# Patient Record
Sex: MALE | Race: WHITE | NOT HISPANIC OR LATINO
[De-identification: names, ages, dates, MRNs, and addresses within clinical notes are randomized per-mention and may not be internally consistent; named-entity substitution may affect disease eponyms.]

---

## 2017-03-08 ENCOUNTER — OTHER (OUTPATIENT)
Age: 67
End: 2017-03-08

## 2017-04-07 ENCOUNTER — APPOINTMENT (OUTPATIENT)
Dept: INTERNAL MEDICINE | Facility: CLINIC | Age: 67
End: 2017-04-07

## 2017-04-07 VITALS
OXYGEN SATURATION: 94 % | DIASTOLIC BLOOD PRESSURE: 80 MMHG | SYSTOLIC BLOOD PRESSURE: 130 MMHG | HEIGHT: 67 IN | BODY MASS INDEX: 34.21 KG/M2 | WEIGHT: 218 LBS | HEART RATE: 78 BPM

## 2017-04-10 ENCOUNTER — RESULT REVIEW (OUTPATIENT)
Age: 67
End: 2017-04-10

## 2017-04-10 LAB
ALBUMIN SERPL ELPH-MCNC: 4.6 G/DL
ALP BLD-CCNC: 82 U/L
ALT SERPL-CCNC: 24 U/L
ANION GAP SERPL CALC-SCNC: 14 MMOL/L
AST SERPL-CCNC: 23 U/L
BASOPHILS # BLD AUTO: 0.05 K/UL
BASOPHILS NFR BLD AUTO: 0.7 %
BILIRUB SERPL-MCNC: 0.3 MG/DL
BUN SERPL-MCNC: 16 MG/DL
CALCIUM SERPL-MCNC: 9.7 MG/DL
CHLORIDE SERPL-SCNC: 103 MMOL/L
CHOLEST SERPL-MCNC: 175 MG/DL
CHOLEST/HDLC SERPL: 3 RATIO
CO2 SERPL-SCNC: 26 MMOL/L
CREAT SERPL-MCNC: 0.83 MG/DL
EOSINOPHIL # BLD AUTO: 0.11 K/UL
EOSINOPHIL NFR BLD AUTO: 1.6 %
GLUCOSE SERPL-MCNC: 144 MG/DL
HBA1C MFR BLD HPLC: 7.2 %
HCT VFR BLD CALC: 44.6 %
HDLC SERPL-MCNC: 59 MG/DL
HGB BLD-MCNC: 14.9 G/DL
IMM GRANULOCYTES NFR BLD AUTO: 0.3 %
LDLC SERPL CALC-MCNC: 93 MG/DL
LYMPHOCYTES # BLD AUTO: 1.3 K/UL
LYMPHOCYTES NFR BLD AUTO: 19.4 %
MAGNESIUM SERPL-MCNC: 2 MG/DL
MAN DIFF?: NORMAL
MCHC RBC-ENTMCNC: 30.3 PG
MCHC RBC-ENTMCNC: 33.4 GM/DL
MCV RBC AUTO: 90.7 FL
MONOCYTES # BLD AUTO: 0.73 K/UL
MONOCYTES NFR BLD AUTO: 10.9 %
NEUTROPHILS # BLD AUTO: 4.49 K/UL
NEUTROPHILS NFR BLD AUTO: 67.1 %
PLATELET # BLD AUTO: 296 K/UL
POTASSIUM SERPL-SCNC: 4.1 MMOL/L
PROT SERPL-MCNC: 7.3 G/DL
PSA SERPL-MCNC: 7.56 NG/ML
RBC # BLD: 4.92 M/UL
RBC # FLD: 13.6 %
SODIUM SERPL-SCNC: 143 MMOL/L
TRIGL SERPL-MCNC: 116 MG/DL
WBC # FLD AUTO: 6.7 K/UL

## 2017-05-01 ENCOUNTER — RX RENEWAL (OUTPATIENT)
Age: 67
End: 2017-05-01

## 2017-06-23 ENCOUNTER — RX RENEWAL (OUTPATIENT)
Age: 67
End: 2017-06-23

## 2017-07-05 ENCOUNTER — APPOINTMENT (OUTPATIENT)
Dept: INTERNAL MEDICINE | Facility: CLINIC | Age: 67
End: 2017-07-05

## 2017-07-05 ENCOUNTER — NON-APPOINTMENT (OUTPATIENT)
Age: 67
End: 2017-07-05

## 2017-07-05 VITALS
BODY MASS INDEX: 34.21 KG/M2 | RESPIRATION RATE: 14 BRPM | DIASTOLIC BLOOD PRESSURE: 86 MMHG | HEART RATE: 64 BPM | WEIGHT: 218 LBS | SYSTOLIC BLOOD PRESSURE: 128 MMHG | HEIGHT: 67 IN

## 2017-07-06 ENCOUNTER — RESULT REVIEW (OUTPATIENT)
Age: 67
End: 2017-07-06

## 2017-07-06 ENCOUNTER — TRANSCRIPTION ENCOUNTER (OUTPATIENT)
Age: 67
End: 2017-07-06

## 2017-07-06 LAB
ALBUMIN SERPL ELPH-MCNC: 4.8 G/DL
ALP BLD-CCNC: 84 U/L
ALT SERPL-CCNC: 26 U/L
ANION GAP SERPL CALC-SCNC: 16 MMOL/L
APPEARANCE: CLEAR
AST SERPL-CCNC: 22 U/L
BACTERIA: NEGATIVE
BASOPHILS # BLD AUTO: 0.07 K/UL
BASOPHILS NFR BLD AUTO: 1.1 %
BILIRUB SERPL-MCNC: 0.4 MG/DL
BILIRUBIN URINE: NEGATIVE
BLOOD URINE: NEGATIVE
BUN SERPL-MCNC: 13 MG/DL
CALCIUM SERPL-MCNC: 9.5 MG/DL
CHLORIDE SERPL-SCNC: 100 MMOL/L
CHOLEST SERPL-MCNC: 173 MG/DL
CHOLEST/HDLC SERPL: 2.9 RATIO
CO2 SERPL-SCNC: 24 MMOL/L
COLOR: YELLOW
CREAT SERPL-MCNC: 0.74 MG/DL
CREAT SPEC-SCNC: 93 MG/DL
EOSINOPHIL # BLD AUTO: 0.14 K/UL
EOSINOPHIL NFR BLD AUTO: 2.1 %
GLUCOSE QUALITATIVE U: NORMAL MG/DL
GLUCOSE SERPL-MCNC: 154 MG/DL
HBA1C MFR BLD HPLC: 7.1 %
HCT VFR BLD CALC: 42.7 %
HDLC SERPL-MCNC: 60 MG/DL
HGB BLD-MCNC: 14.4 G/DL
HYALINE CASTS: 0 /LPF
IMM GRANULOCYTES NFR BLD AUTO: 0.2 %
KETONES URINE: NEGATIVE
LDLC SERPL CALC-MCNC: 81 MG/DL
LEUKOCYTE ESTERASE URINE: NEGATIVE
LYMPHOCYTES # BLD AUTO: 1.04 K/UL
LYMPHOCYTES NFR BLD AUTO: 15.9 %
MAGNESIUM SERPL-MCNC: 2 MG/DL
MAN DIFF?: NORMAL
MCHC RBC-ENTMCNC: 29.8 PG
MCHC RBC-ENTMCNC: 33.7 GM/DL
MCV RBC AUTO: 88.4 FL
MICROALBUMIN 24H UR DL<=1MG/L-MCNC: 2.3 MG/DL
MICROALBUMIN/CREAT 24H UR-RTO: 25 MG/G
MICROSCOPIC-UA: NORMAL
MONOCYTES # BLD AUTO: 0.65 K/UL
MONOCYTES NFR BLD AUTO: 9.9 %
NEUTROPHILS # BLD AUTO: 4.63 K/UL
NEUTROPHILS NFR BLD AUTO: 70.8 %
NITRITE URINE: NEGATIVE
PH URINE: 7.5
PLATELET # BLD AUTO: 280 K/UL
POTASSIUM SERPL-SCNC: 4 MMOL/L
PROT SERPL-MCNC: 7.3 G/DL
PROTEIN URINE: NEGATIVE MG/DL
RBC # BLD: 4.83 M/UL
RBC # FLD: 13.2 %
RED BLOOD CELLS URINE: 1 /HPF
SODIUM SERPL-SCNC: 140 MMOL/L
SPECIFIC GRAVITY URINE: 1.02
SQUAMOUS EPITHELIAL CELLS: 1 /HPF
TRIGL SERPL-MCNC: 161 MG/DL
UROBILINOGEN URINE: NORMAL MG/DL
WBC # FLD AUTO: 6.54 K/UL
WHITE BLOOD CELLS URINE: 1 /HPF

## 2017-07-14 ENCOUNTER — OTHER (OUTPATIENT)
Age: 67
End: 2017-07-14

## 2017-07-15 LAB
CREAT 24H UR-MCNC: 1.9 G/24 H
CREAT ?TM UR-MCNC: 75 MG/DL
PROT 24H UR-MRATE: 8 MG/DL
PROT ?TM UR-MCNC: 24 HR
PROT UR-MCNC: 198 MG/24 H
SPECIMEN VOL 24H UR: 2475 ML

## 2017-07-17 ENCOUNTER — RESULT REVIEW (OUTPATIENT)
Age: 67
End: 2017-07-17

## 2017-07-26 ENCOUNTER — APPOINTMENT (OUTPATIENT)
Dept: ORTHOPEDIC SURGERY | Facility: CLINIC | Age: 67
End: 2017-07-26

## 2017-07-26 VITALS
BODY MASS INDEX: 32.96 KG/M2 | DIASTOLIC BLOOD PRESSURE: 85 MMHG | HEIGHT: 67 IN | SYSTOLIC BLOOD PRESSURE: 151 MMHG | WEIGHT: 210 LBS | HEART RATE: 71 BPM

## 2017-08-01 ENCOUNTER — TRANSCRIPTION ENCOUNTER (OUTPATIENT)
Age: 67
End: 2017-08-01

## 2017-10-27 ENCOUNTER — RX RENEWAL (OUTPATIENT)
Age: 67
End: 2017-10-27

## 2017-12-13 ENCOUNTER — RX RENEWAL (OUTPATIENT)
Age: 67
End: 2017-12-13

## 2017-12-13 ENCOUNTER — APPOINTMENT (OUTPATIENT)
Dept: INTERNAL MEDICINE | Facility: CLINIC | Age: 67
End: 2017-12-13
Payer: COMMERCIAL

## 2017-12-13 VITALS
HEART RATE: 71 BPM | HEIGHT: 67 IN | DIASTOLIC BLOOD PRESSURE: 80 MMHG | SYSTOLIC BLOOD PRESSURE: 120 MMHG | BODY MASS INDEX: 33.12 KG/M2 | WEIGHT: 211 LBS | RESPIRATION RATE: 98 BRPM

## 2017-12-13 DIAGNOSIS — Z23 ENCOUNTER FOR IMMUNIZATION: ICD-10-CM

## 2017-12-13 PROCEDURE — 99214 OFFICE O/P EST MOD 30 MIN: CPT | Mod: 25

## 2017-12-13 PROCEDURE — 36415 COLL VENOUS BLD VENIPUNCTURE: CPT

## 2017-12-14 LAB
ALBUMIN SERPL ELPH-MCNC: 4.8 G/DL
ALP BLD-CCNC: 90 U/L
ALT SERPL-CCNC: 16 U/L
ANION GAP SERPL CALC-SCNC: 16 MMOL/L
AST SERPL-CCNC: 18 U/L
BASOPHILS # BLD AUTO: 0.07 K/UL
BASOPHILS NFR BLD AUTO: 1.2 %
BILIRUB SERPL-MCNC: 0.6 MG/DL
BUN SERPL-MCNC: 19 MG/DL
CALCIUM SERPL-MCNC: 9.8 MG/DL
CHLORIDE SERPL-SCNC: 99 MMOL/L
CHOLEST SERPL-MCNC: 162 MG/DL
CHOLEST/HDLC SERPL: 2.9 RATIO
CO2 SERPL-SCNC: 25 MMOL/L
CREAT SERPL-MCNC: 0.76 MG/DL
EOSINOPHIL # BLD AUTO: 0.13 K/UL
EOSINOPHIL NFR BLD AUTO: 2.2 %
GLUCOSE SERPL-MCNC: 136 MG/DL
HBA1C MFR BLD HPLC: 6.8 %
HCT VFR BLD CALC: 44 %
HDLC SERPL-MCNC: 56 MG/DL
HGB BLD-MCNC: 14.9 G/DL
IMM GRANULOCYTES NFR BLD AUTO: 0.2 %
LDLC SERPL CALC-MCNC: 81 MG/DL
LYMPHOCYTES # BLD AUTO: 1 K/UL
LYMPHOCYTES NFR BLD AUTO: 16.6 %
MAGNESIUM SERPL-MCNC: 2.1 MG/DL
MAN DIFF?: NORMAL
MCHC RBC-ENTMCNC: 30.5 PG
MCHC RBC-ENTMCNC: 33.9 GM/DL
MCV RBC AUTO: 90.2 FL
MONOCYTES # BLD AUTO: 0.73 K/UL
MONOCYTES NFR BLD AUTO: 12.1 %
NEUTROPHILS # BLD AUTO: 4.07 K/UL
NEUTROPHILS NFR BLD AUTO: 67.7 %
PLATELET # BLD AUTO: 256 K/UL
POTASSIUM SERPL-SCNC: 4.1 MMOL/L
PROT SERPL-MCNC: 7.3 G/DL
PSA SERPL-MCNC: 6.29 NG/ML
RBC # BLD: 4.88 M/UL
RBC # FLD: 13 %
SODIUM SERPL-SCNC: 140 MMOL/L
TRIGL SERPL-MCNC: 126 MG/DL
WBC # FLD AUTO: 6.01 K/UL

## 2017-12-18 ENCOUNTER — RX RENEWAL (OUTPATIENT)
Age: 67
End: 2017-12-18

## 2017-12-19 ENCOUNTER — RESULT REVIEW (OUTPATIENT)
Age: 67
End: 2017-12-19

## 2018-04-24 ENCOUNTER — RX RENEWAL (OUTPATIENT)
Age: 68
End: 2018-04-24

## 2018-05-16 ENCOUNTER — OTHER (OUTPATIENT)
Age: 68
End: 2018-05-16

## 2018-05-29 ENCOUNTER — APPOINTMENT (OUTPATIENT)
Dept: ORTHOPEDIC SURGERY | Facility: CLINIC | Age: 68
End: 2018-05-29
Payer: COMMERCIAL

## 2018-05-29 VITALS
SYSTOLIC BLOOD PRESSURE: 163 MMHG | HEART RATE: 75 BPM | HEIGHT: 67 IN | BODY MASS INDEX: 33.12 KG/M2 | WEIGHT: 211 LBS | DIASTOLIC BLOOD PRESSURE: 89 MMHG

## 2018-05-29 PROCEDURE — 20610 DRAIN/INJ JOINT/BURSA W/O US: CPT | Mod: 50

## 2018-05-29 PROCEDURE — 73564 X-RAY EXAM KNEE 4 OR MORE: CPT | Mod: 50

## 2018-05-29 PROCEDURE — 99215 OFFICE O/P EST HI 40 MIN: CPT | Mod: 25

## 2018-06-18 ENCOUNTER — APPOINTMENT (OUTPATIENT)
Dept: INTERNAL MEDICINE | Facility: CLINIC | Age: 68
End: 2018-06-18
Payer: COMMERCIAL

## 2018-06-18 VITALS
HEART RATE: 68 BPM | BODY MASS INDEX: 33.9 KG/M2 | DIASTOLIC BLOOD PRESSURE: 80 MMHG | HEIGHT: 67 IN | OXYGEN SATURATION: 98 % | SYSTOLIC BLOOD PRESSURE: 130 MMHG | WEIGHT: 216 LBS

## 2018-06-18 PROCEDURE — 99214 OFFICE O/P EST MOD 30 MIN: CPT | Mod: 25

## 2018-06-18 PROCEDURE — 36415 COLL VENOUS BLD VENIPUNCTURE: CPT

## 2018-06-20 ENCOUNTER — RESULT REVIEW (OUTPATIENT)
Age: 68
End: 2018-06-20

## 2018-06-20 LAB
ALBUMIN SERPL ELPH-MCNC: 4.7 G/DL
ALP BLD-CCNC: 83 U/L
ALT SERPL-CCNC: 25 U/L
ANION GAP SERPL CALC-SCNC: 16 MMOL/L
APPEARANCE: CLEAR
AST SERPL-CCNC: 17 U/L
BACTERIA: NEGATIVE
BASOPHILS # BLD AUTO: 0.06 K/UL
BASOPHILS NFR BLD AUTO: 0.8 %
BILIRUB SERPL-MCNC: 0.4 MG/DL
BILIRUBIN URINE: NEGATIVE
BLOOD URINE: NEGATIVE
BUN SERPL-MCNC: 14 MG/DL
CALCIUM SERPL-MCNC: 9.4 MG/DL
CHLORIDE SERPL-SCNC: 101 MMOL/L
CHOLEST SERPL-MCNC: 154 MG/DL
CHOLEST/HDLC SERPL: 2.1 RATIO
CO2 SERPL-SCNC: 23 MMOL/L
COLOR: YELLOW
CREAT SERPL-MCNC: 0.74 MG/DL
EOSINOPHIL # BLD AUTO: 0.13 K/UL
EOSINOPHIL NFR BLD AUTO: 1.8 %
GLUCOSE QUALITATIVE U: NEGATIVE MG/DL
GLUCOSE SERPL-MCNC: 151 MG/DL
HBA1C MFR BLD HPLC: 7.7 %
HCT VFR BLD CALC: 43.6 %
HDLC SERPL-MCNC: 74 MG/DL
HGB BLD-MCNC: 14.4 G/DL
IMM GRANULOCYTES NFR BLD AUTO: 0.3 %
KETONES URINE: NEGATIVE
LDLC SERPL CALC-MCNC: 57 MG/DL
LEUKOCYTE ESTERASE URINE: NEGATIVE
LYMPHOCYTES # BLD AUTO: 1.12 K/UL
LYMPHOCYTES NFR BLD AUTO: 15.5 %
MAGNESIUM SERPL-MCNC: 1.9 MG/DL
MAN DIFF?: NORMAL
MCHC RBC-ENTMCNC: 30 PG
MCHC RBC-ENTMCNC: 33 GM/DL
MCV RBC AUTO: 90.8 FL
MICROSCOPIC-UA: NORMAL
MONOCYTES # BLD AUTO: 0.68 K/UL
MONOCYTES NFR BLD AUTO: 9.4 %
NEUTROPHILS # BLD AUTO: 5.23 K/UL
NEUTROPHILS NFR BLD AUTO: 72.2 %
NITRITE URINE: NEGATIVE
PH URINE: 6
PLATELET # BLD AUTO: 280 K/UL
POTASSIUM SERPL-SCNC: 3.8 MMOL/L
PROT SERPL-MCNC: 7.1 G/DL
PROTEIN URINE: NEGATIVE MG/DL
PSA FREE FLD-MCNC: 17.2
PSA FREE SERPL-MCNC: 0.95 NG/ML
PSA SERPL-MCNC: 5.51 NG/ML
PSA SERPL-MCNC: 5.76 NG/ML
RBC # BLD: 4.8 M/UL
RBC # FLD: 13.8 %
RED BLOOD CELLS URINE: 0 /HPF
SODIUM SERPL-SCNC: 140 MMOL/L
SPECIFIC GRAVITY URINE: 1.02
SQUAMOUS EPITHELIAL CELLS: 0 /HPF
TRIGL SERPL-MCNC: 113 MG/DL
UROBILINOGEN URINE: NEGATIVE MG/DL
WBC # FLD AUTO: 7.24 K/UL
WHITE BLOOD CELLS URINE: 1 /HPF

## 2018-09-26 ENCOUNTER — APPOINTMENT (OUTPATIENT)
Dept: ORTHOPEDIC SURGERY | Facility: CLINIC | Age: 68
End: 2018-09-26
Payer: COMMERCIAL

## 2018-09-26 VITALS
WEIGHT: 216 LBS | HEART RATE: 69 BPM | HEIGHT: 67 IN | BODY MASS INDEX: 33.9 KG/M2 | DIASTOLIC BLOOD PRESSURE: 89 MMHG | SYSTOLIC BLOOD PRESSURE: 160 MMHG

## 2018-09-26 DIAGNOSIS — M17.12 UNILATERAL PRIMARY OSTEOARTHRITIS, LEFT KNEE: ICD-10-CM

## 2018-09-26 DIAGNOSIS — M17.11 UNILATERAL PRIMARY OSTEOARTHRITIS, RIGHT KNEE: ICD-10-CM

## 2018-09-26 PROCEDURE — 99214 OFFICE O/P EST MOD 30 MIN: CPT

## 2018-10-18 ENCOUNTER — RX RENEWAL (OUTPATIENT)
Age: 68
End: 2018-10-18

## 2018-10-18 ENCOUNTER — TRANSCRIPTION ENCOUNTER (OUTPATIENT)
Age: 68
End: 2018-10-18

## 2018-10-22 ENCOUNTER — NON-APPOINTMENT (OUTPATIENT)
Age: 68
End: 2018-10-22

## 2018-10-22 ENCOUNTER — APPOINTMENT (OUTPATIENT)
Dept: INTERNAL MEDICINE | Facility: CLINIC | Age: 68
End: 2018-10-22
Payer: COMMERCIAL

## 2018-10-22 VITALS
DIASTOLIC BLOOD PRESSURE: 78 MMHG | BODY MASS INDEX: 33.27 KG/M2 | HEIGHT: 67 IN | WEIGHT: 212 LBS | RESPIRATION RATE: 16 BRPM | SYSTOLIC BLOOD PRESSURE: 122 MMHG | HEART RATE: 63 BPM

## 2018-10-22 PROCEDURE — 36415 COLL VENOUS BLD VENIPUNCTURE: CPT

## 2018-10-22 PROCEDURE — 99397 PER PM REEVAL EST PAT 65+ YR: CPT | Mod: 25

## 2018-10-22 PROCEDURE — 93000 ELECTROCARDIOGRAM COMPLETE: CPT

## 2018-10-22 NOTE — HEALTH RISK ASSESSMENT
[Good] : ~his/her~  mood as  good [No falls in past year] : Patient reported no falls in the past year [0] : 2) Feeling down, depressed, or hopeless: Not at all (0) [None] : None [Alone] : lives alone [Employed] : employed [Single] : single [# Of Children ___] : has [unfilled] children [Feels Safe at Home] : Feels safe at home [Fully functional (bathing, dressing, toileting, transferring, walking, feeding)] : Fully functional (bathing, dressing, toileting, transferring, walking, feeding) [Fully functional (using the telephone, shopping, preparing meals, housekeeping, doing laundry, using] : Fully functional and needs no help or supervision to perform IADLs (using the telephone, shopping, preparing meals, housekeeping, doing laundry, using transportation, managing medications and managing finances) [Smoke Detector] : smoke detector [Carbon Monoxide Detector] : carbon monoxide detector [Seat Belt] :  uses seat belt [Sunscreen] : uses sunscreen [Discussed at today's visit] : Advance Directives Discussed at today's visit [Designated Healthcare Proxy] : Designated healthcare proxy [Name: ___] : Health Care Proxy's Name: [unfilled]  [] : No [de-identified] : rare [[id number]] : 0 [Change in mental status noted] : No change in mental status noted [Reports changes in hearing] : Reports no changes in hearing [Reports changes in vision] : Reports no changes in vision [Reports changes in dental health] : Reports no changes in dental health [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

## 2018-10-22 NOTE — PHYSICAL EXAM
[General Appearance - Alert] : alert [General Appearance - In No Acute Distress] : in no acute distress [Sclera] : the sclera and conjunctiva were normal [PERRL With Normal Accommodation] : pupils were equal in size, round, and reactive to light [Extraocular Movements] : extraocular movements were intact [Outer Ear] : the ears and nose were normal in appearance [Oropharynx] : the oropharynx was normal [Neck Appearance] : the appearance of the neck was normal [Neck Cervical Mass (___cm)] : no neck mass was observed [Jugular Venous Distention Increased] : there was no jugular-venous distention [Thyroid Diffuse Enlargement] : the thyroid was not enlarged [Thyroid Nodule] : there were no palpable thyroid nodules [Auscultation Breath Sounds / Voice Sounds] : lungs were clear to auscultation bilaterally [Heart Rate And Rhythm] : heart rate was normal and rhythm regular [Heart Sounds] : normal S1 and S2 [Heart Sounds Gallop] : no gallops [Murmurs] : no murmurs [Heart Sounds Pericardial Friction Rub] : no pericardial rub [Arterial Pulses Carotid] : carotid pulses were normal with no bruits [Abdominal Aorta] : the abdominal aorta was normal [Arterial Pulses Femoral] : femoral pulses were normal without bruits [Full Pulse] : the pedal pulses are present [Edema] : there was no peripheral edema [Veins - Varicosity Changes] : there were no varicosital changes [Bowel Sounds] : normal bowel sounds [Abdomen Soft] : soft [Abdomen Tenderness] : non-tender [Abdomen Mass (___ Cm)] : no abdominal mass palpated [Cervical Lymph Nodes Enlarged Posterior Bilaterally] : posterior cervical [Cervical Lymph Nodes Enlarged Anterior Bilaterally] : anterior cervical [Supraclavicular Lymph Nodes Enlarged Bilaterally] : supraclavicular [Axillary Lymph Nodes Enlarged Bilaterally] : axillary [Femoral Lymph Nodes Enlarged Bilaterally] : femoral [Inguinal Lymph Nodes Enlarged Bilaterally] : inguinal [No Spinal Tenderness] : no spinal tenderness [Abnormal Walk] : normal gait [Nail Clubbing] : no clubbing  or cyanosis of the fingernails [Musculoskeletal - Swelling] : no joint swelling seen [Motor Tone] : muscle strength and tone were normal [Skin Color & Pigmentation] : normal skin color and pigmentation [Skin Turgor] : normal skin turgor [] : no rash [Right Foot Was Examined] : right foot was examined [Left Foot Was Examined] : left foot was examined [Normal Appearance] : normal in appearance [Normal in Appearance] : normal in appearance [Normal] : normal [2+] : 2+ in the dorsalis pedis [Cranial Nerves] : cranial nerves 2-12 were intact [No Focal Deficits] : no focal deficits [Oriented To Time, Place, And Person] : oriented to person, place, and time [Impaired Insight] : insight and judgment were intact [Affect] : the affect was normal [FreeTextEntry1] : By urology [#1 Diminished] : number 1 was normal [#2 Diminished] : number 2 was normal [#3 Diminished] : number 3 was normal [#4 Diminished] : number 4 was normal [#5 Diminished] : number 5 was normal [#6 Diminished] : number 6 was normal [#7 Diminished] : number 7 was normal [#8 Diminished] : number 8 was normal [#9 Diminished] : number 9 was normal [#10 Diminished] : number 10 was normal [Over the Past 2 Weeks, Have You Felt Down, Depressed, or Hopeless?] : 1.) Over the past 2 weeks, have you felt down, depressed, or hopeless? No [Over the Past 2 Weeks, Have You Felt Little Interest or Pleasure Doing Things?] : 2.) Over the past 2 weeks, have you felt little interest or pleasure doing things? No

## 2018-10-22 NOTE — ASSESSMENT
[FreeTextEntry1] : 68-year-old male with history of hypertension which is controlled with present medications, hyperlipidemia which is also controlled and type 2 diabetes with most recent hemoglobin A1c increased at 7.7. currently medically stable without any active medical issues.\par Patient told if he diabetes control is no  better he will need medication.\par \par Patient continues to need lifestyle changes with diet exercise and weight loss.\par He is to followup with urology as scheduled\par He is to continue present medications\par \par Colonoscopy August 2018 with followup in 3 years secondary to history of polyps\par Ophthalmology yearly. september 2018 with no diabetic retinopathy.\par \par All of the vaccines up-to-date\par influenza vaccine. He received at work\par \par Followup in 3 months

## 2018-10-22 NOTE — HISTORY OF PRESENT ILLNESS
[FreeTextEntry1] : 68-year-old male with history of diet-controlled type 2 diabetes, hypertension, hyperlipidemia and obesity presents for his yearly physical.\par \par Patient's history also includes BPH with elevated PSA and has had biopsies in the past and follows with urologist . Recent evaluation with urology showed PSA decreased from 7 to 5.6 with 4K  score showing 98% benign.Most recent PSA was stable\par \par The patient had left carpal tunnel syndrome repair April 2017 with good results.\par The patient started with symptoms began in his left hand, mainly his thumb and index finger with surgical evaluation thinking not related to carpal tunnel syndrome, but his neck.\par He did have a recent MRI showing nerve impingement in his neck and will followup with orthopedic.\par \par Otherwise generally is feeling fine without complaints including no chest pain, palpitations, shortness of breath or edema.\par \par The patient's only complaint is increased symptoms to a known arthritic left knee for which he is seeing orthopedic.He is scheduled for a left total knee replacement later this year\par \par The patient has not made any lifestyle changes with diet nor exercising for no weight loss.

## 2018-10-23 ENCOUNTER — TRANSCRIPTION ENCOUNTER (OUTPATIENT)
Age: 68
End: 2018-10-23

## 2018-10-23 ENCOUNTER — RESULT REVIEW (OUTPATIENT)
Age: 68
End: 2018-10-23

## 2018-10-23 DIAGNOSIS — M50.30 OTHER CERVICAL DISC DEGENERATION, UNSPECIFIED CERVICAL REGION: ICD-10-CM

## 2018-10-23 LAB
ALBUMIN SERPL ELPH-MCNC: 4.6 G/DL
ALP BLD-CCNC: 90 U/L
ALT SERPL-CCNC: 27 U/L
ANION GAP SERPL CALC-SCNC: 18 MMOL/L
APPEARANCE: CLEAR
AST SERPL-CCNC: 22 U/L
BACTERIA: NEGATIVE
BASOPHILS # BLD AUTO: 0.05 K/UL
BASOPHILS NFR BLD AUTO: 0.8 %
BILIRUB SERPL-MCNC: 0.4 MG/DL
BILIRUBIN URINE: NEGATIVE
BLOOD URINE: NEGATIVE
BUN SERPL-MCNC: 15 MG/DL
CALCIUM SERPL-MCNC: 9.8 MG/DL
CHLORIDE SERPL-SCNC: 101 MMOL/L
CHOLEST SERPL-MCNC: 168 MG/DL
CHOLEST/HDLC SERPL: 3 RATIO
CO2 SERPL-SCNC: 22 MMOL/L
COLOR: YELLOW
CREAT SERPL-MCNC: 0.74 MG/DL
CREAT SPEC-SCNC: 86 MG/DL
EOSINOPHIL # BLD AUTO: 0.11 K/UL
EOSINOPHIL NFR BLD AUTO: 1.7 %
GLUCOSE QUALITATIVE U: NEGATIVE MG/DL
GLUCOSE SERPL-MCNC: 158 MG/DL
HBA1C MFR BLD HPLC: 7.3 %
HCT VFR BLD CALC: 43.8 %
HDLC SERPL-MCNC: 56 MG/DL
HGB BLD-MCNC: 14.7 G/DL
IMM GRANULOCYTES NFR BLD AUTO: 0.2 %
KETONES URINE: NEGATIVE
LDLC SERPL CALC-MCNC: 86 MG/DL
LEUKOCYTE ESTERASE URINE: NEGATIVE
LYMPHOCYTES # BLD AUTO: 1.18 K/UL
LYMPHOCYTES NFR BLD AUTO: 18 %
MAGNESIUM SERPL-MCNC: 2 MG/DL
MAN DIFF?: NORMAL
MCHC RBC-ENTMCNC: 30.4 PG
MCHC RBC-ENTMCNC: 33.6 GM/DL
MCV RBC AUTO: 90.5 FL
MICROALBUMIN 24H UR DL<=1MG/L-MCNC: 2.8 MG/DL
MICROALBUMIN/CREAT 24H UR-RTO: 33 MG/G
MICROSCOPIC-UA: NORMAL
MONOCYTES # BLD AUTO: 0.48 K/UL
MONOCYTES NFR BLD AUTO: 7.3 %
NEUTROPHILS # BLD AUTO: 4.74 K/UL
NEUTROPHILS NFR BLD AUTO: 72 %
NITRITE URINE: NEGATIVE
PH URINE: 7
PLATELET # BLD AUTO: 294 K/UL
POTASSIUM SERPL-SCNC: 4 MMOL/L
PROT SERPL-MCNC: 7.3 G/DL
PROTEIN URINE: NEGATIVE MG/DL
RBC # BLD: 4.84 M/UL
RBC # FLD: 13 %
RED BLOOD CELLS URINE: 1 /HPF
SODIUM SERPL-SCNC: 141 MMOL/L
SPECIFIC GRAVITY URINE: 1.02
SQUAMOUS EPITHELIAL CELLS: 0 /HPF
TRIGL SERPL-MCNC: 131 MG/DL
UROBILINOGEN URINE: NEGATIVE MG/DL
WBC # FLD AUTO: 6.57 K/UL
WHITE BLOOD CELLS URINE: 0 /HPF

## 2018-10-24 DIAGNOSIS — R80.9 PROTEINURIA, UNSPECIFIED: ICD-10-CM

## 2018-11-16 ENCOUNTER — RESULT REVIEW (OUTPATIENT)
Age: 68
End: 2018-11-16

## 2018-11-16 ENCOUNTER — TRANSCRIPTION ENCOUNTER (OUTPATIENT)
Age: 68
End: 2018-11-16

## 2018-11-16 LAB
CREAT 24H UR-MCNC: 1.8 G/24 H
CREAT ?TM UR-MCNC: 91 MG/DL
PROT 24H UR-MRATE: 7 MG/DL
PROT ?TM UR-MCNC: 24 HR
PROT UR-MCNC: 140 MG/24 H
SPECIMEN VOL 24H UR: 2000 ML

## 2018-11-23 ENCOUNTER — OUTPATIENT (OUTPATIENT)
Dept: OUTPATIENT SERVICES | Facility: HOSPITAL | Age: 68
LOS: 1 days | End: 2018-11-23
Payer: COMMERCIAL

## 2018-11-23 VITALS
HEIGHT: 67 IN | HEART RATE: 59 BPM | DIASTOLIC BLOOD PRESSURE: 81 MMHG | SYSTOLIC BLOOD PRESSURE: 134 MMHG | TEMPERATURE: 98 F | RESPIRATION RATE: 16 BRPM | WEIGHT: 207.23 LBS

## 2018-11-23 DIAGNOSIS — Z98.890 OTHER SPECIFIED POSTPROCEDURAL STATES: Chronic | ICD-10-CM

## 2018-11-23 DIAGNOSIS — Z09 ENCOUNTER FOR FOLLOW-UP EXAMINATION AFTER COMPLETED TREATMENT FOR CONDITIONS OTHER THAN MALIGNANT NEOPLASM: Chronic | ICD-10-CM

## 2018-11-23 DIAGNOSIS — M17.12 UNILATERAL PRIMARY OSTEOARTHRITIS, LEFT KNEE: ICD-10-CM

## 2018-11-23 DIAGNOSIS — Z90.49 ACQUIRED ABSENCE OF OTHER SPECIFIED PARTS OF DIGESTIVE TRACT: Chronic | ICD-10-CM

## 2018-11-23 DIAGNOSIS — Z29.9 ENCOUNTER FOR PROPHYLACTIC MEASURES, UNSPECIFIED: ICD-10-CM

## 2018-11-23 DIAGNOSIS — Z90.89 ACQUIRED ABSENCE OF OTHER ORGANS: Chronic | ICD-10-CM

## 2018-11-23 DIAGNOSIS — Z01.818 ENCOUNTER FOR OTHER PREPROCEDURAL EXAMINATION: ICD-10-CM

## 2018-11-23 DIAGNOSIS — I10 ESSENTIAL (PRIMARY) HYPERTENSION: ICD-10-CM

## 2018-11-23 DIAGNOSIS — Z98.49 CATARACT EXTRACTION STATUS, UNSPECIFIED EYE: Chronic | ICD-10-CM

## 2018-11-23 LAB
ANION GAP SERPL CALC-SCNC: 14 MMOL/L — SIGNIFICANT CHANGE UP (ref 5–17)
APTT BLD: 35.8 SEC — SIGNIFICANT CHANGE UP (ref 27.5–36.3)
BASOPHILS # BLD AUTO: 0 K/UL — SIGNIFICANT CHANGE UP (ref 0–0.2)
BASOPHILS NFR BLD AUTO: 0.7 % — SIGNIFICANT CHANGE UP (ref 0–2)
BLD GP AB SCN SERPL QL: SIGNIFICANT CHANGE UP
BUN SERPL-MCNC: 13 MG/DL — SIGNIFICANT CHANGE UP (ref 8–20)
CALCIUM SERPL-MCNC: 10.1 MG/DL — SIGNIFICANT CHANGE UP (ref 8.6–10.2)
CHLORIDE SERPL-SCNC: 100 MMOL/L — SIGNIFICANT CHANGE UP (ref 98–107)
CO2 SERPL-SCNC: 26 MMOL/L — SIGNIFICANT CHANGE UP (ref 22–29)
CREAT SERPL-MCNC: 0.65 MG/DL — SIGNIFICANT CHANGE UP (ref 0.5–1.3)
EOSINOPHIL # BLD AUTO: 0.1 K/UL — SIGNIFICANT CHANGE UP (ref 0–0.5)
EOSINOPHIL NFR BLD AUTO: 1.3 % — SIGNIFICANT CHANGE UP (ref 0–5)
GLUCOSE SERPL-MCNC: 136 MG/DL — HIGH (ref 70–115)
HCT VFR BLD CALC: 44.2 % — SIGNIFICANT CHANGE UP (ref 42–52)
HGB BLD-MCNC: 15 G/DL — SIGNIFICANT CHANGE UP (ref 14–18)
INR BLD: 1 RATIO — SIGNIFICANT CHANGE UP (ref 0.88–1.16)
LYMPHOCYTES # BLD AUTO: 0.9 K/UL — LOW (ref 1–4.8)
LYMPHOCYTES # BLD AUTO: 16.1 % — LOW (ref 20–55)
MCHC RBC-ENTMCNC: 30.9 PG — SIGNIFICANT CHANGE UP (ref 27–31)
MCHC RBC-ENTMCNC: 33.9 G/DL — SIGNIFICANT CHANGE UP (ref 32–36)
MCV RBC AUTO: 90.9 FL — SIGNIFICANT CHANGE UP (ref 80–94)
MONOCYTES # BLD AUTO: 0.6 K/UL — SIGNIFICANT CHANGE UP (ref 0–0.8)
MONOCYTES NFR BLD AUTO: 10.1 % — HIGH (ref 3–10)
MRSA PCR RESULT.: SIGNIFICANT CHANGE UP
NEUTROPHILS # BLD AUTO: 4 K/UL — SIGNIFICANT CHANGE UP (ref 1.8–8)
NEUTROPHILS NFR BLD AUTO: 71.6 % — SIGNIFICANT CHANGE UP (ref 37–73)
PLATELET # BLD AUTO: 250 K/UL — SIGNIFICANT CHANGE UP (ref 150–400)
POTASSIUM SERPL-MCNC: 3.6 MMOL/L — SIGNIFICANT CHANGE UP (ref 3.5–5.3)
POTASSIUM SERPL-SCNC: 3.6 MMOL/L — SIGNIFICANT CHANGE UP (ref 3.5–5.3)
PROTHROM AB SERPL-ACNC: 11.5 SEC — SIGNIFICANT CHANGE UP (ref 10–12.9)
RBC # BLD: 4.86 M/UL — SIGNIFICANT CHANGE UP (ref 4.6–6.2)
RBC # FLD: 13.2 % — SIGNIFICANT CHANGE UP (ref 11–15.6)
S AUREUS DNA NOSE QL NAA+PROBE: DETECTED
SODIUM SERPL-SCNC: 140 MMOL/L — SIGNIFICANT CHANGE UP (ref 135–145)
TYPE + AB SCN PNL BLD: SIGNIFICANT CHANGE UP
WBC # BLD: 5.5 K/UL — SIGNIFICANT CHANGE UP (ref 4.8–10.8)
WBC # FLD AUTO: 5.5 K/UL — SIGNIFICANT CHANGE UP (ref 4.8–10.8)

## 2018-11-23 PROCEDURE — 87640 STAPH A DNA AMP PROBE: CPT

## 2018-11-23 PROCEDURE — 86901 BLOOD TYPING SEROLOGIC RH(D): CPT

## 2018-11-23 PROCEDURE — 87641 MR-STAPH DNA AMP PROBE: CPT

## 2018-11-23 PROCEDURE — 85027 COMPLETE CBC AUTOMATED: CPT

## 2018-11-23 PROCEDURE — 93005 ELECTROCARDIOGRAM TRACING: CPT

## 2018-11-23 PROCEDURE — 85730 THROMBOPLASTIN TIME PARTIAL: CPT

## 2018-11-23 PROCEDURE — 85610 PROTHROMBIN TIME: CPT

## 2018-11-23 PROCEDURE — 80048 BASIC METABOLIC PNL TOTAL CA: CPT

## 2018-11-23 PROCEDURE — G0463: CPT

## 2018-11-23 PROCEDURE — 86850 RBC ANTIBODY SCREEN: CPT

## 2018-11-23 PROCEDURE — 86900 BLOOD TYPING SEROLOGIC ABO: CPT

## 2018-11-23 PROCEDURE — 36415 COLL VENOUS BLD VENIPUNCTURE: CPT

## 2018-11-23 PROCEDURE — 93010 ELECTROCARDIOGRAM REPORT: CPT

## 2018-11-23 RX ORDER — MUPIROCIN 20 MG/G
1 OINTMENT TOPICAL
Qty: 1 | Refills: 0 | OUTPATIENT
Start: 2018-11-23 | End: 2018-11-27

## 2018-11-23 NOTE — H&P PST ADULT - ASSESSMENT
68 year old male with h/o HTN, HLD and diet controlled DM present with c/p left knee pain    CAPRINI SCORE [CLOT]    AGE RELATED RISK FACTORS                                                       MOBILITY RELATED FACTORS  [ ] Age 41-60 years                                            (1 Point)                  [ ] Bed rest                                                        (1 Point)  [x ] Age: 61-74 years                                           (2 Points)                 [ ] Plaster cast                                                   (2 Points)  [ ] Age= 75 years                                              (3 Points)                 [ ] Bed bound for more than 72 hours                 (2 Points)    DISEASE RELATED RISK FACTORS                                               GENDER SPECIFIC FACTORS  [x ] Edema in the lower extremities                       (1 Point)                  [ ] Pregnancy                                                     (1 Point)  [ ] Varicose veins                                               (1 Point)                  [ ] Post-partum < 6 weeks                                   (1 Point)             [x ] BMI > 25 Kg/m2                                            (1 Point)                  [ ] Hormonal therapy  or oral contraception          (1 Point)                 [ ] Sepsis (in the previous month)                        (1 Point)                  [ ] History of pregnancy complications                 (1 point)  [ ] Pneumonia or serious lung disease                                               [ ] Unexplained or recurrent                     (1 Point)           (in the previous month)                               (1 Point)  [ ] Abnormal pulmonary function test                     (1 Point)                 SURGERY RELATED RISK FACTORS  [ ] Acute myocardial infarction                              (1 Point)                 [ ]  Section                                             (1 Point)  [ ] Congestive heart failure (in the previous month)  (1 Point)               [ ] Minor surgery                                                  (1 Point)   [ ] Inflammatory bowel disease                             (1 Point)                 [ ] Arthroscopic surgery                                        (2 Points)  [ ] Central venous access                                      (2 Points)                [ ] General surgery lasting more than 45 minutes   (2 Points)       [ ] Stroke (in the previous month)                          (5 Points)               [x ] Elective arthroplasty                                         (5 Points)                                                                                                                                               HEMATOLOGY RELATED FACTORS                                                 TRAUMA RELATED RISK FACTORS  [ ] Prior episodes of VTE                                     (3 Points)                [ ] Fracture of the hip, pelvis, or leg                       (5 Points)  [ ] Positive family history for VTE                         (3 Points)                 [ ] Acute spinal cord injury (in the previous month)  (5 Points)  [ ] Prothrombin 16847 A                                     (3 Points)                 [ ] Paralysis  (less than 1 month)                             (5 Points)  [ ] Factor V Leiden                                             (3 Points)                  [ ] Multiple Trauma within 1 month                        (5 Points)  [ ] Lupus anticoagulants                                     (3 Points)                                                           [ ] Anticardiolipin antibodies                               (3 Points)                                                       [ ] High homocysteine in the blood                      (3 Points)                                             [ ] Other congenital or acquired thrombophilia      (3 Points)                                                [ ] Heparin induced thrombocytopenia                  (3 Points)                                          Total Score [ 9     ]    OPIOID RISK TOOL    ARLYN EACH BOX THAT APPLIES AND ADD TOTALS AT THE END    FAMILY HISTORY OF SUBSTANCE ABUSE                 FEMALE         MALE                                                Alcohol                             [  ]1 pt          [  ]3pts                                               Illegal Durgs                     [  ]2 pts        [  ]3pts                                               Rx Drugs                           [  ]4 pts        [  ]4 pts    PERSONAL HISTORY OF SUBSTANCE ABUSE                                                                                          Alcohol                             [  ]3 pts       [  ]3 pts                                               Illegal Drugs                     [  ]4 pts        [  ]4 pts                                               Rx Drugs                           [  ]5 pts        [  ]5 pts    AGE BETWEEN 16-45 YEARS                                      [  ]1 pt         [  ]1 pt    HISTORY OF PREADOLESCENT   SEXUAL ABUSE                                                             [  ]3 pts        [  ]0pts    PSYCHOLOGICAL DISEASE                     ADD, OCD, Bipolar, Schizophrenia        [  ]2 pts         [  ]2 pts                      Depression                                               [  ]1 pt           [  ]1 pt           SCORING TOTAL   (Zero)              (***)                                     A score of 3 or lower indicated LOW risk for future opioid abuse  A score of 4 to 7 indicated moderate risk for future opioid abuse  A score of 8 or higher indicates a high risk for opioid abuse

## 2018-11-23 NOTE — H&P PST ADULT - PSH
H/O prostate biopsy    History of carpal tunnel release of both wrists  2016  S/P appendectomy    S/P inguinal hernia repair  right and left  S/P LASIK surgery of both eyes    S/P tonsillectomy and adenoidectomy    S/P umbilical hernia repair, follow-up exam

## 2018-11-23 NOTE — PATIENT PROFILE ADULT - FALL HARM RISK
bones(Osteoporosis,prev fx,steroid use,metastatic bone ca) surgery/bones(Osteoporosis,prev fx,steroid use,metastatic bone ca)

## 2018-11-23 NOTE — H&P PST ADULT - FAMILY HISTORY
Mother  Still living? No  Family history of heart disease, Age at diagnosis: Age Unknown  Family history of breast cancer, Age at diagnosis: Age Unknown     Father  Still living? Unknown  Family history of pancreatic cancer, Age at diagnosis: Age Unknown     Sibling  Still living? Unknown  Family history of diabetes mellitus, Age at diagnosis: Age Unknown

## 2018-11-23 NOTE — H&P PST ADULT - NSANTHOSAYNRD_GEN_A_CORE
No. SAMRA screening performed.  STOP BANG Legend: 0-2 = LOW Risk; 3-4 = INTERMEDIATE Risk; 5-8 = HIGH Risk

## 2018-11-23 NOTE — H&P PST ADULT - HISTORY OF PRESENT ILLNESS
68 year old male present with left knee pain, unrelieved with conservative treatments.  He is now schedule for left knee total joint replacement.

## 2018-11-23 NOTE — H&P PST ADULT - PMH
Borderline diabetes mellitus  weight controled  BPH with elevated PSA    HTN (hypertension)    Lyme disease    OA (osteoarthritis) Borderline diabetes mellitus  weight controlled  BPH with elevated PSA    HTN (hypertension)    Lyme disease    OA (osteoarthritis)

## 2018-11-24 PROBLEM — R73.03 PREDIABETES: Chronic | Status: ACTIVE | Noted: 2018-11-23

## 2018-11-26 ENCOUNTER — RESULT CHARGE (OUTPATIENT)
Age: 68
End: 2018-11-26

## 2018-11-26 PROBLEM — I10 ESSENTIAL (PRIMARY) HYPERTENSION: Chronic | Status: ACTIVE | Noted: 2018-11-23

## 2018-11-26 PROBLEM — M19.90 UNSPECIFIED OSTEOARTHRITIS, UNSPECIFIED SITE: Chronic | Status: ACTIVE | Noted: 2018-11-23

## 2018-11-26 PROBLEM — N40.0 BENIGN PROSTATIC HYPERPLASIA WITHOUT LOWER URINARY TRACT SYMPTOMS: Chronic | Status: ACTIVE | Noted: 2018-11-23

## 2018-11-26 PROBLEM — A69.20 LYME DISEASE, UNSPECIFIED: Chronic | Status: ACTIVE | Noted: 2018-11-23

## 2018-11-27 ENCOUNTER — APPOINTMENT (OUTPATIENT)
Dept: INTERNAL MEDICINE | Facility: CLINIC | Age: 68
End: 2018-11-27
Payer: COMMERCIAL

## 2018-11-27 VITALS — DIASTOLIC BLOOD PRESSURE: 85 MMHG | SYSTOLIC BLOOD PRESSURE: 125 MMHG | HEART RATE: 69 BPM

## 2018-11-27 VITALS — WEIGHT: 212 LBS | BODY MASS INDEX: 33.27 KG/M2 | HEIGHT: 67 IN

## 2018-11-27 DIAGNOSIS — H61.21 IMPACTED CERUMEN, RIGHT EAR: ICD-10-CM

## 2018-11-27 DIAGNOSIS — H93.8X1 OTHER SPECIFIED DISORDERS OF RIGHT EAR: ICD-10-CM

## 2018-11-27 PROCEDURE — 99213 OFFICE O/P EST LOW 20 MIN: CPT | Mod: 25

## 2018-11-27 PROCEDURE — 69209 REMOVE IMPACTED EAR WAX UNI: CPT | Mod: RT

## 2018-11-27 NOTE — HISTORY OF PRESENT ILLNESS
[FreeTextEntry8] : Patient presents complaining that his right ear feels fluid-filled with associated decreased hearing and mild ringing for 5 days. Patient states that canal is more narrow and he does get an issue with it from time to time and thought he got water in it from the shower. Patient asked the pharmacist what to take and he took Claritin x2 days without benefit. Patient states his left ear is fine he has no associated symptoms i.e. sore throat/congestion/cough/fever.

## 2018-11-27 NOTE — ASSESSMENT
[FreeTextEntry1] : Status post irrigation-patient tolerated well. Told patient he can take Claritin and also use Flonase in hopes of opening eustachian tube. Patient will call if symptoms persist or worsen and return to the office as scheduled

## 2018-11-28 ENCOUNTER — APPOINTMENT (OUTPATIENT)
Dept: INTERNAL MEDICINE | Facility: CLINIC | Age: 68
End: 2018-11-28
Payer: COMMERCIAL

## 2018-11-28 VITALS
BODY MASS INDEX: 31.71 KG/M2 | WEIGHT: 202 LBS | HEIGHT: 67 IN | DIASTOLIC BLOOD PRESSURE: 82 MMHG | SYSTOLIC BLOOD PRESSURE: 134 MMHG | RESPIRATION RATE: 16 BRPM | HEART RATE: 71 BPM

## 2018-11-28 PROCEDURE — 99214 OFFICE O/P EST MOD 30 MIN: CPT

## 2018-11-28 NOTE — ASSESSMENT
[Patient Optimized for Surgery] : Patient optimized for surgery [Cardiology consultation] : Cardiology consultation [Continue medications as is] : Continue current medications [FreeTextEntry4] : 68-year-old male currently medically stable, with no contraindication to planned procedure.\par \par Patient with increased cardiovascular risks therefore, to get preoperative cardiac evaluation.

## 2018-11-28 NOTE — RESULTS/DATA
[] : results reviewed [de-identified] : WNL [de-identified] : WNL [de-identified] : WNL [de-identified] : SB,IRBBB, Insignificant inferior Qs

## 2018-11-28 NOTE — PHYSICAL EXAM
[No Acute Distress] : no acute distress [Well-Appearing] : well-appearing [Normal Sclera/Conjunctiva] : normal sclera/conjunctiva [No JVD] : no jugular venous distention [No Respiratory Distress] : no respiratory distress  [Clear to Auscultation] : lungs were clear to auscultation bilaterally [No Accessory Muscle Use] : no accessory muscle use [Normal Rate] : normal rate  [Regular Rhythm] : with a regular rhythm [No Carotid Bruits] : no carotid bruits [No Edema] : there was no peripheral edema [Soft] : abdomen soft [Non Tender] : non-tender [Non-distended] : non-distended [No Masses] : no abdominal mass palpated [No HSM] : no HSM [No Focal Deficits] : no focal deficits [Normal Affect] : the affect was normal [de-identified] : armen

## 2018-11-28 NOTE — HISTORY OF PRESENT ILLNESS
[No Pertinent Cardiac History] : no history of aortic stenosis, atrial fibrillation, coronary artery disease, recent myocardial infarction, or implantable device/pacemaker [No Pertinent Pulmonary History] : no history of asthma, COPD, sleep apnea, or smoking [No Adverse Anesthesia Reaction] : no adverse anesthesia reaction in self or family member [Diabetes] : diabetes [(Patient denies any chest pain, claudication, dyspnea on exertion, orthopnea, palpitations or syncope)] : Patient denies any chest pain, claudication, dyspnea on exertion, orthopnea, palpitations or syncope [Chronic Anticoagulation] : no chronic anticoagulation [Chronic Kidney Disease] : no chronic kidney disease [FreeTextEntry1] : Left total knee replacement [FreeTextEntry2] : January 6,2018 [FreeTextEntry3] : Dr Gonzales [FreeTextEntry4] : 68-year-old male presents for medical evaluation prior to left total knee replacement.\par \par Patient's medical history includes hypertension, hyperlipidemia, type 2 diabetes, and obesity.\par The patient had no cardiac history, but does have a history of mild mitral regurgitation.\par \par No other cardiopulmonary, hepatorenal, hematological, diagnoses.\par \par Overall, he is feeling well without chest pain, palpitations, shortness of breath, or edema.

## 2018-11-30 ENCOUNTER — APPOINTMENT (OUTPATIENT)
Dept: CARDIOLOGY | Facility: CLINIC | Age: 68
End: 2018-11-30
Payer: COMMERCIAL

## 2018-11-30 ENCOUNTER — APPOINTMENT (OUTPATIENT)
Dept: INTERNAL MEDICINE | Facility: CLINIC | Age: 68
End: 2018-11-30
Payer: COMMERCIAL

## 2018-11-30 VITALS
OXYGEN SATURATION: 97 % | DIASTOLIC BLOOD PRESSURE: 80 MMHG | HEIGHT: 67 IN | WEIGHT: 206 LBS | SYSTOLIC BLOOD PRESSURE: 151 MMHG | RESPIRATION RATE: 16 BRPM | BODY MASS INDEX: 32.33 KG/M2 | HEART RATE: 80 BPM

## 2018-11-30 VITALS — DIASTOLIC BLOOD PRESSURE: 74 MMHG | SYSTOLIC BLOOD PRESSURE: 126 MMHG

## 2018-11-30 PROBLEM — H61.21 IMPACTED CERUMEN OF RIGHT EAR: Status: RESOLVED | Noted: 2018-11-27 | Resolved: 2018-11-30

## 2018-11-30 PROBLEM — H93.8X1 CONGESTION OF RIGHT EAR: Status: RESOLVED | Noted: 2018-11-27 | Resolved: 2018-11-30

## 2018-11-30 PROCEDURE — 93000 ELECTROCARDIOGRAM COMPLETE: CPT

## 2018-11-30 PROCEDURE — 99204 OFFICE O/P NEW MOD 45 MIN: CPT

## 2018-11-30 PROCEDURE — 99213 OFFICE O/P EST LOW 20 MIN: CPT

## 2018-11-30 RX ORDER — AMOXICILLIN AND CLAVULANATE POTASSIUM 875; 125 MG/1; MG/1
875-125 TABLET, COATED ORAL
Qty: 14 | Refills: 0 | Status: DISCONTINUED | COMMUNITY
Start: 2018-11-30 | End: 2018-11-30

## 2018-11-30 NOTE — PHYSICAL EXAM
[General Appearance - Well Developed] : well developed [Normal Appearance] : normal appearance [Well Groomed] : well groomed [General Appearance - Well Nourished] : well nourished [General Appearance - In No Acute Distress] : no acute distress [Normal Conjunctiva] : the conjunctiva exhibited no abnormalities [No Oral Pallor] : no oral pallor [No Oral Cyanosis] : no oral cyanosis [Normal Jugular Venous A Waves Present] : normal jugular venous A waves present [Normal Jugular Venous V Waves Present] : normal jugular venous V waves present [Respiration, Rhythm And Depth] : normal respiratory rhythm and effort [Exaggerated Use Of Accessory Muscles For Inspiration] : no accessory muscle use [Auscultation Breath Sounds / Voice Sounds] : lungs were clear to auscultation bilaterally [Heart Rate And Rhythm] : heart rate and rhythm were normal [Heart Sounds] : normal S1 and S2 [Murmurs] : no murmurs present [Arterial Pulses Normal] : the arterial pulses were normal [Bowel Sounds] : normal bowel sounds [Abdomen Soft] : soft [Abdomen Tenderness] : non-tender [Abnormal Walk] : normal gait [Nail Clubbing] : no clubbing of the fingernails [Cyanosis, Localized] : no localized cyanosis [Skin Turgor] : normal skin turgor [] : no rash [Oriented To Time, Place, And Person] : oriented to person, place, and time [Impaired Insight] : insight and judgment were intact [Affect] : the affect was normal [Memory Recent] : recent memory was not impaired

## 2018-11-30 NOTE — HISTORY OF PRESENT ILLNESS
[FreeTextEntry8] : Patient was seen on 11/27 complaining of..\par \par Patient presents complaining that his right ear feels fluid-filled with associated decreased hearing and mild ringing for 5 days. Patient states that canal is more narrow and he does get an issue with it from time to time and thought he got water in it from the shower. Patient asked the pharmacist what to take and he took Claritin x2 days without benefit. Patient states his left ear is fine he has no associated symptoms i.e. sore throat/congestion/cough/fever.\par \par ** RIGHT Ear was irrigated without complication, patient states the ear has been okay up until last night when he started to develop pain in the ear, no discharge and reports hearing is okay but has mild ringing. Left ear is normal and no associated sick symptoms including fever. He has surgery scheduled in one week

## 2018-11-30 NOTE — PHYSICAL EXAM
[de-identified] : Left ear is normal, right ear with+Pinnal pain, negative tragal, canal is swollen with mild bulge to TM

## 2018-11-30 NOTE — ASSESSMENT
[FreeTextEntry1] : Patient given Augmentin 875 mg one b.i.d. #14 and Ciprodex otic. Patient will call if symptoms persist or worsen in the office as scheduled for regular followup

## 2018-11-30 NOTE — PHYSICAL EXAM
[Normal Outer Ear/Nose] : the outer ears and nose were normal in appearance [de-identified] : Left ear is normal, right ear with cerumen impaction status post removal-patient tolerated well [No Acute Distress] : no acute distress [Normal Oropharynx] : the oropharynx was normal [Normal Nasal Mucosa] : the nasal mucosa was normal [Supple] : supple [No Respiratory Distress] : no respiratory distress  [Clear to Auscultation] : lungs were clear to auscultation bilaterally [Normal Rate] : normal rate  [Regular Rhythm] : with a regular rhythm [Normal Affect] : the affect was normal [Normal Mood] : the mood was normal

## 2018-12-03 ENCOUNTER — APPOINTMENT (OUTPATIENT)
Dept: CARDIOLOGY | Facility: CLINIC | Age: 68
End: 2018-12-03
Payer: COMMERCIAL

## 2018-12-03 PROCEDURE — 78452 HT MUSCLE IMAGE SPECT MULT: CPT

## 2018-12-03 PROCEDURE — A9500: CPT

## 2018-12-03 PROCEDURE — 93015 CV STRESS TEST SUPVJ I&R: CPT

## 2018-12-04 NOTE — REVIEW OF SYSTEMS
[Recent Weight Loss (___ Lbs)] : recent [unfilled] ~Ulb weight loss [Urinary Frequency] : urinary frequency [see HPI] : see HPI [Negative] : Heme/Lymph [Fever] : no fever [Chills] : no chills

## 2018-12-04 NOTE — HISTORY OF PRESENT ILLNESS
[Preoperative Visit] : for a medical evaluation prior to surgery [Scheduled Procedure ___] : a [unfilled] [Date of Surgery ___] : on [unfilled] [Surgeon Name ___] : surgeon: [unfilled] [Good] : Good [Urinary Frequency] : urinary frequency [Fever] : no fever [Chills] : no chills [Fatigue] : no fatigue [Chest Pain] : no chest pain [Cough] : no cough [Dyspnea] : no dyspnea [Dysuria] : no dysuria [Nausea] : no nausea [Vomiting] : no vomiting [Diarrhea] : no diarrhea [Abdominal Pain] : no abdominal pain [Easy Bruising] : no easy bruising [Lower Extremity Swelling] : no lower extremity swelling [Poor Exercise Tolerance] : no poor exercise tolerance

## 2018-12-05 ENCOUNTER — TRANSCRIPTION ENCOUNTER (OUTPATIENT)
Age: 68
End: 2018-12-05

## 2018-12-06 ENCOUNTER — RX RENEWAL (OUTPATIENT)
Age: 68
End: 2018-12-06

## 2018-12-06 ENCOUNTER — INPATIENT (INPATIENT)
Facility: HOSPITAL | Age: 68
LOS: 0 days | Discharge: HOME | DRG: 470 | End: 2018-12-07
Attending: ORTHOPAEDIC SURGERY | Admitting: ORTHOPAEDIC SURGERY
Payer: COMMERCIAL

## 2018-12-06 ENCOUNTER — APPOINTMENT (OUTPATIENT)
Dept: ORTHOPEDIC SURGERY | Facility: HOSPITAL | Age: 68
End: 2018-12-06

## 2018-12-06 ENCOUNTER — TRANSCRIPTION ENCOUNTER (OUTPATIENT)
Age: 68
End: 2018-12-06

## 2018-12-06 VITALS
SYSTOLIC BLOOD PRESSURE: 140 MMHG | DIASTOLIC BLOOD PRESSURE: 82 MMHG | WEIGHT: 199.96 LBS | RESPIRATION RATE: 16 BRPM | HEART RATE: 69 BPM | TEMPERATURE: 98 F | OXYGEN SATURATION: 96 % | HEIGHT: 67 IN

## 2018-12-06 DIAGNOSIS — Z98.890 OTHER SPECIFIED POSTPROCEDURAL STATES: Chronic | ICD-10-CM

## 2018-12-06 DIAGNOSIS — Z90.49 ACQUIRED ABSENCE OF OTHER SPECIFIED PARTS OF DIGESTIVE TRACT: Chronic | ICD-10-CM

## 2018-12-06 DIAGNOSIS — Z90.89 ACQUIRED ABSENCE OF OTHER ORGANS: Chronic | ICD-10-CM

## 2018-12-06 DIAGNOSIS — M17.12 UNILATERAL PRIMARY OSTEOARTHRITIS, LEFT KNEE: ICD-10-CM

## 2018-12-06 DIAGNOSIS — R73.03 PREDIABETES: ICD-10-CM

## 2018-12-06 DIAGNOSIS — Z09 ENCOUNTER FOR FOLLOW-UP EXAMINATION AFTER COMPLETED TREATMENT FOR CONDITIONS OTHER THAN MALIGNANT NEOPLASM: Chronic | ICD-10-CM

## 2018-12-06 DIAGNOSIS — I10 ESSENTIAL (PRIMARY) HYPERTENSION: ICD-10-CM

## 2018-12-06 LAB
ABO RH CONFIRMATION: SIGNIFICANT CHANGE UP
GLUCOSE BLDC GLUCOMTR-MCNC: 131 MG/DL — HIGH (ref 70–99)
GLUCOSE BLDC GLUCOMTR-MCNC: 132 MG/DL — HIGH (ref 70–99)
GLUCOSE BLDC GLUCOMTR-MCNC: 134 MG/DL — HIGH (ref 70–99)

## 2018-12-06 PROCEDURE — 99223 1ST HOSP IP/OBS HIGH 75: CPT

## 2018-12-06 PROCEDURE — 73560 X-RAY EXAM OF KNEE 1 OR 2: CPT | Mod: 26,LT

## 2018-12-06 PROCEDURE — 27447 TOTAL KNEE ARTHROPLASTY: CPT | Mod: LT

## 2018-12-06 PROCEDURE — 27447 TOTAL KNEE ARTHROPLASTY: CPT | Mod: AS,LT

## 2018-12-06 PROCEDURE — 20985 CPTR-ASST DIR MS PX: CPT

## 2018-12-06 RX ORDER — DOCUSATE SODIUM 100 MG
100 CAPSULE ORAL THREE TIMES A DAY
Qty: 0 | Refills: 0 | Status: DISCONTINUED | OUTPATIENT
Start: 2018-12-06 | End: 2018-12-07

## 2018-12-06 RX ORDER — TRANEXAMIC ACID 100 MG/ML
900 INJECTION, SOLUTION INTRAVENOUS ONCE
Qty: 0 | Refills: 0 | Status: DISCONTINUED | OUTPATIENT
Start: 2018-12-06 | End: 2018-12-06

## 2018-12-06 RX ORDER — SODIUM CHLORIDE 9 MG/ML
1000 INJECTION, SOLUTION INTRAVENOUS
Qty: 0 | Refills: 0 | Status: DISCONTINUED | OUTPATIENT
Start: 2018-12-06 | End: 2018-12-06

## 2018-12-06 RX ORDER — HYDROMORPHONE HYDROCHLORIDE 2 MG/ML
1 INJECTION INTRAMUSCULAR; INTRAVENOUS; SUBCUTANEOUS
Qty: 0 | Refills: 0 | Status: DISCONTINUED | OUTPATIENT
Start: 2018-12-06 | End: 2018-12-06

## 2018-12-06 RX ORDER — OXYCODONE HYDROCHLORIDE 5 MG/1
10 TABLET ORAL
Qty: 0 | Refills: 0 | Status: DISCONTINUED | OUTPATIENT
Start: 2018-12-06 | End: 2018-12-07

## 2018-12-06 RX ORDER — ONDANSETRON 8 MG/1
4 TABLET, FILM COATED ORAL EVERY 6 HOURS
Qty: 0 | Refills: 0 | Status: DISCONTINUED | OUTPATIENT
Start: 2018-12-06 | End: 2018-12-07

## 2018-12-06 RX ORDER — GABAPENTIN 400 MG/1
600 CAPSULE ORAL ONCE
Qty: 0 | Refills: 0 | Status: COMPLETED | OUTPATIENT
Start: 2018-12-06 | End: 2018-12-06

## 2018-12-06 RX ORDER — ACETAMINOPHEN 500 MG
975 TABLET ORAL EVERY 8 HOURS
Qty: 0 | Refills: 0 | Status: DISCONTINUED | OUTPATIENT
Start: 2018-12-06 | End: 2018-12-07

## 2018-12-06 RX ORDER — SODIUM CHLORIDE 9 MG/ML
1000 INJECTION, SOLUTION INTRAVENOUS
Qty: 0 | Refills: 0 | Status: DISCONTINUED | OUTPATIENT
Start: 2018-12-06 | End: 2018-12-07

## 2018-12-06 RX ORDER — ACETAMINOPHEN 500 MG
1000 TABLET ORAL
Qty: 0 | Refills: 0 | Status: COMPLETED | OUTPATIENT
Start: 2018-12-06 | End: 2018-12-06

## 2018-12-06 RX ORDER — ACETAMINOPHEN 500 MG
1000 TABLET ORAL ONCE
Qty: 0 | Refills: 0 | Status: DISCONTINUED | OUTPATIENT
Start: 2018-12-06 | End: 2018-12-06

## 2018-12-06 RX ORDER — VANCOMYCIN HCL 1 G
1500 VIAL (EA) INTRAVENOUS ONCE
Qty: 0 | Refills: 0 | Status: COMPLETED | OUTPATIENT
Start: 2018-12-06 | End: 2018-12-06

## 2018-12-06 RX ORDER — TRANEXAMIC ACID 100 MG/ML
950 INJECTION, SOLUTION INTRAVENOUS ONCE
Qty: 0 | Refills: 0 | Status: DISCONTINUED | OUTPATIENT
Start: 2018-12-06 | End: 2018-12-06

## 2018-12-06 RX ORDER — CEFAZOLIN SODIUM 1 G
2000 VIAL (EA) INJECTION ONCE
Qty: 0 | Refills: 0 | Status: DISCONTINUED | OUTPATIENT
Start: 2018-12-06 | End: 2018-12-06

## 2018-12-06 RX ORDER — ASPIRIN/CALCIUM CARB/MAGNESIUM 324 MG
325 TABLET ORAL
Qty: 0 | Refills: 0 | Status: DISCONTINUED | OUTPATIENT
Start: 2018-12-07 | End: 2018-12-07

## 2018-12-06 RX ORDER — CELECOXIB 200 MG/1
400 CAPSULE ORAL ONCE
Qty: 0 | Refills: 0 | Status: COMPLETED | OUTPATIENT
Start: 2018-12-06 | End: 2018-12-06

## 2018-12-06 RX ORDER — SENNA PLUS 8.6 MG/1
2 TABLET ORAL AT BEDTIME
Qty: 0 | Refills: 0 | Status: DISCONTINUED | OUTPATIENT
Start: 2018-12-06 | End: 2018-12-07

## 2018-12-06 RX ORDER — VANCOMYCIN HCL 1 G
1500 VIAL (EA) INTRAVENOUS
Qty: 0 | Refills: 0 | Status: COMPLETED | OUTPATIENT
Start: 2018-12-06 | End: 2018-12-07

## 2018-12-06 RX ORDER — CEFAZOLIN SODIUM 1 G
2000 VIAL (EA) INJECTION
Qty: 0 | Refills: 0 | Status: COMPLETED | OUTPATIENT
Start: 2018-12-06 | End: 2018-12-07

## 2018-12-06 RX ORDER — ROSUVASTATIN CALCIUM 5 MG/1
1 TABLET ORAL
Qty: 0 | Refills: 0 | COMMUNITY

## 2018-12-06 RX ORDER — OXYCODONE HYDROCHLORIDE 5 MG/1
10 TABLET ORAL ONCE
Qty: 0 | Refills: 0 | Status: DISCONTINUED | OUTPATIENT
Start: 2018-12-06 | End: 2018-12-06

## 2018-12-06 RX ORDER — FENTANYL CITRATE 50 UG/ML
50 INJECTION INTRAVENOUS
Qty: 0 | Refills: 0 | Status: DISCONTINUED | OUTPATIENT
Start: 2018-12-06 | End: 2018-12-06

## 2018-12-06 RX ORDER — KETOROLAC TROMETHAMINE 30 MG/ML
15 SYRINGE (ML) INJECTION EVERY 6 HOURS
Qty: 0 | Refills: 0 | Status: DISCONTINUED | OUTPATIENT
Start: 2018-12-06 | End: 2018-12-07

## 2018-12-06 RX ORDER — HYDROMORPHONE HYDROCHLORIDE 2 MG/ML
0.5 INJECTION INTRAMUSCULAR; INTRAVENOUS; SUBCUTANEOUS EVERY 4 HOURS
Qty: 0 | Refills: 0 | Status: DISCONTINUED | OUTPATIENT
Start: 2018-12-06 | End: 2018-12-07

## 2018-12-06 RX ORDER — ONDANSETRON 8 MG/1
4 TABLET, FILM COATED ORAL ONCE
Qty: 0 | Refills: 0 | Status: DISCONTINUED | OUTPATIENT
Start: 2018-12-06 | End: 2018-12-06

## 2018-12-06 RX ORDER — OXYCODONE HYDROCHLORIDE 5 MG/1
5 TABLET ORAL
Qty: 0 | Refills: 0 | Status: DISCONTINUED | OUTPATIENT
Start: 2018-12-06 | End: 2018-12-07

## 2018-12-06 RX ORDER — AMLODIPINE BESYLATE AND BENAZEPRIL HYDROCHLORIDE 10; 20 MG/1; MG/1
1 CAPSULE ORAL
Qty: 0 | Refills: 0 | COMMUNITY

## 2018-12-06 RX ADMIN — Medication 100 MILLIGRAM(S): at 21:31

## 2018-12-06 RX ADMIN — GABAPENTIN 600 MILLIGRAM(S): 400 CAPSULE ORAL at 11:31

## 2018-12-06 RX ADMIN — OXYCODONE HYDROCHLORIDE 10 MILLIGRAM(S): 5 TABLET ORAL at 11:32

## 2018-12-06 RX ADMIN — Medication 1000 MILLIGRAM(S): at 21:46

## 2018-12-06 RX ADMIN — Medication 300 MILLIGRAM(S): at 12:23

## 2018-12-06 RX ADMIN — CELECOXIB 400 MILLIGRAM(S): 200 CAPSULE ORAL at 11:31

## 2018-12-06 RX ADMIN — Medication 400 MILLIGRAM(S): at 21:31

## 2018-12-06 NOTE — DISCHARGE NOTE ADULT - NS AS ACTIVITY OBS
Walking-Indoors allowed/Walking-Outdoors allowed/Stairs allowed/No Heavy lifting/straining/Showering allowed/Do not make important decisions/Do not drive or operate machinery

## 2018-12-06 NOTE — DISCHARGE NOTE ADULT - PLAN OF CARE
Decrease Pain, Improve Ambulation and Activities of Daily Living The patient will be seen in the office in 3 weeks for wound check. Tape will be removed at that time. Patient may shower after post-op day #3. The dressing is to be removed on post-op day #9(12/15/2018).  IF THE DRESSING BECOMES SOILED BEFORE THE REMOVAL DATE, CHANGE WITH A SIMILAR DRESSING. IF THE DRESSING BECOMES STAINED WITH DISCHARGE, CONTACT THE OFFICE FOR FURTHER DIRECTIONS. The patient will contact the office if the wound becomes red, has increasing pain, develops bleeding or discharge, an injury occurs, or has other concerns. The patient will continue PT consistent with total knee replacement. The patient will continue LOVENOX for 2 weeks and then begin ASPIRIN 325mg TWICE daily for 4 weeks for blood clot prevention. The patient will take OXYCODONE AND TYLENOL for pain control and titrate according to prescription and patient needs. The patient will take SENNA-S while taking oxycodone to prevent narcotic associated constipation.  Additionally, increase water intake (drink at least 8 glasses of water daily) and try adding fiber to the diet by eating fruits, vegetables and foods that are rich in grains. If constipation is experienced, contact the medical/primary care provider to discuss further treatment options. The patient is FULL weight bearing. Elevation of the lower leg is recommended to reduce swelling. The patient will be seen in the office in 3 weeks for wound check. Tape will be removed at that time. Patient may shower after post-op day #5. The dressing is to be removed on post-op day #9(12/15/2018).  IF THE DRESSING BECOMES SOILED BEFORE THE REMOVAL DATE, CHANGE WITH A SIMILAR DRESSING. IF THE DRESSING BECOMES STAINED WITH DISCHARGE, CONTACT THE OFFICE FOR FURTHER DIRECTIONS. The patient will contact the office if the wound becomes red, has increasing pain, develops bleeding or discharge, an injury occurs, or has other concerns. The patient will continue PT consistent with total knee replacement. The patient will continue ASPIRIN 81mg TWICE daily for 6 weeks for blood clot prevention. The patient will take OXYCODONE AND TYLENOL for pain control and titrate according to prescription and patient needs. The patient will take SENNA-S while taking oxycodone to prevent narcotic associated constipation.  Additionally, increase water intake (drink at least 8 glasses of water daily) and try adding fiber to the diet by eating fruits, vegetables and foods that are rich in grains. If constipation is experienced, contact the medical/primary care provider to discuss further treatment options. The patient is FULL weight bearing. Elevation of the lower leg is recommended to reduce swelling.

## 2018-12-06 NOTE — PROGRESS NOTE ADULT - SUBJECTIVE AND OBJECTIVE BOX
PA - Note    Ortho Post Op Check    Name: JEFF VEGA    MR #: [id number]    Procedure: Left Total Knee Arthroplasty   Surgeon: Dionte Gonzales    Pt comfortable without complaints, pain controlled  Denies CP, SOB, N/V, numbness/tingling     General Exam:  Vital Signs Last 24 Hrs  T(C): 36.5 (12-06-18 @ 16:46), Max: 36.5 (12-06-18 @ 16:46)  T(F): 97.7 (12-06-18 @ 16:46), Max: 97.7 (12-06-18 @ 16:46)  HR: 77 (12-06-18 @ 20:00) (57 - 77)  BP: 114/73 (12-06-18 @ 20:00) (107/60 - 121/68)  BP(mean): --  RR: 24 (12-06-18 @ 20:00) (12 - 24)  SpO2: 98% (12-06-18 @ 20:00) (95% - 99%)    General: Pt Alert and oriented, NAD, controlled pain.  Dressings C/D/I. No bleeding.  Pulses: 2+ dorsalis pedis pulse. Cap refill < 2 sec.  Sensation: Grossly intact to light touch without deficit.  Motor: + ROM of toes, + Dorsal/Plantar Flexion of Foot    Post-op X-Ray:  Knee films reviewed. Implants are in appropriate position. No fracture or dislocation noted. Patient is WBAT of the surgical extremity.      A/P: 68yMale POD#0 s/p Left Total Knee Arthroplasty  - Stable  - Pain Control  - DVT ppx: Lovenox, SCDs  - Post op abx: Ancef, vancomycin  - PT eval pending  - Weight bearing status: WBAT  - D/C Planning (Home)

## 2018-12-06 NOTE — DISCHARGE NOTE ADULT - HOSPITAL COURSE
The patient underwent a LEFT TOTAL KNEE REPLACEMENT on 12/6/2018. The patient received antibiotics consistent with SCIP guidelines. The patient underwent the procedure and had no intra-operative complications. Post-operatively, the patient was seen by medicine and PT. The patient received LOVENOX for DVTP. The patient received pain medications per orthopedic pain management protocol and the pain was appropriately controlled. The patient did not have any post-operative medical complications. The patient was discharged in stable condition. The patient underwent a LEFT TOTAL KNEE REPLACEMENT on 12/6/2018. The patient received antibiotics consistent with SCIP guidelines. The patient underwent the procedure and had no intra-operative complications. Post-operatively, the patient was seen by medicine and PT. The patient received ASA for DVTP. The patient received pain medications per orthopedic pain management protocol and the pain was appropriately controlled. The patient did not have any post-operative medical complications. The patient was discharged in stable condition.

## 2018-12-06 NOTE — CONSULT NOTE ADULT - ASSESSMENT
69 yo male with Diabetes Mellitus , htn , obesity cronic knee pain with osteoarthritis admitted for elective knee arthroplasty post op medical evaluation requested   records from PCP and cardiology reviewed

## 2018-12-06 NOTE — CONSULT NOTE ADULT - PROBLEM SELECTOR RECOMMENDATION 9
s/p knee arthroplasty post op medical evaluation completed  cont pain medication , PT OT   dvt prophylaxis per ortho protocol

## 2018-12-06 NOTE — DISCHARGE NOTE ADULT - MEDICATION SUMMARY - MEDICATIONS TO TAKE
I will START or STAY ON the medications listed below when I get home from the hospital:    oxyCODONE 10 mg oral tablet  -- 1 tab(s) by mouth every 3 to 4 hours, As Needed for Pain  MDD:6 pt may halve tablets  -- Indication: For Prn pain    aspirin 81 mg oral delayed release tablet  -- 1 tab(s) by mouth 2 times a day   -- Indication: For DVTP x 6 weeks    rosuvastatin 10 mg oral tablet  -- 1 tab(s) by mouth once a day (at bedtime)  -- Indication: For Home med    amlodipine-benazepril 5 mg-40 mg oral capsule  -- 1 cap(s) by mouth once a day  -- Indication: For Home med    hydroCHLOROthiazide 25 mg oral tablet  -- 1 tab(s) by mouth once a day  -- Indication: For Home med    Senna S 50 mg-8.6 mg oral tablet  -- 2 tab(s) by mouth once a day (at bedtime)   -- Medication should be taken with plenty of water.    -- Indication: For constipation prevention    omeprazole 40 mg oral delayed release capsule  -- 1 cap(s) by mouth once a day   -- It is very important that you take or use this exactly as directed.  Do not skip doses or discontinue unless directed by your doctor.  Obtain medical advice before taking any non-prescription drugs as some may affect the action of this medication.  Swallow whole.  Do not crush.    -- Indication: For PPI- preevnt GI upset

## 2018-12-06 NOTE — DISCHARGE NOTE ADULT - CARE PROVIDER_API CALL
Dionte Gonzales (MD), Orthopaedic Surgery  [address]  [place] [address]  Phone: [phone]  Fax: [phone]

## 2018-12-06 NOTE — BRIEF OPERATIVE NOTE - PRE-OP DX
Does NOT APPEAR VISUALLY SIGNIFICANT. Primary osteoarthritis of left knee  12/06/2018    Active  Dionte Gonzales

## 2018-12-06 NOTE — BRIEF OPERATIVE NOTE - PROCEDURE
<<-----Click on this checkbox to enter Procedure TKA (total knee arthroplasty)  12/06/2018    Active  MNETT

## 2018-12-06 NOTE — DISCHARGE NOTE ADULT - MEDICATION SUMMARY - MEDICATIONS TO STOP TAKING
I will STOP taking the medications listed below when I get home from the hospital:    Multiple Vitamins oral tablet  -- 1 tab(s) by mouth 2 times a day    Osteo Bi-Flex 250 mg-200 mg oral tablet  -- orally once a day

## 2018-12-06 NOTE — DISCHARGE NOTE ADULT - CARE PLAN
Principal Discharge DX:	Unilateral primary osteoarthritis, left knee  Goal:	Decrease Pain, Improve Ambulation and Activities of Daily Living  Assessment and plan of treatment:	The patient will be seen in the office in 3 weeks for wound check. Tape will be removed at that time. Patient may shower after post-op day #3. The dressing is to be removed on post-op day #9(12/15/2018).  IF THE DRESSING BECOMES SOILED BEFORE THE REMOVAL DATE, CHANGE WITH A SIMILAR DRESSING. IF THE DRESSING BECOMES STAINED WITH DISCHARGE, CONTACT THE OFFICE FOR FURTHER DIRECTIONS. The patient will contact the office if the wound becomes red, has increasing pain, develops bleeding or discharge, an injury occurs, or has other concerns. The patient will continue PT consistent with total knee replacement. The patient will continue LOVENOX for 2 weeks and then begin ASPIRIN 325mg TWICE daily for 4 weeks for blood clot prevention. The patient will take OXYCODONE AND TYLENOL for pain control and titrate according to prescription and patient needs. The patient will take SENNA-S while taking oxycodone to prevent narcotic associated constipation.  Additionally, increase water intake (drink at least 8 glasses of water daily) and try adding fiber to the diet by eating fruits, vegetables and foods that are rich in grains. If constipation is experienced, contact the medical/primary care provider to discuss further treatment options. The patient is FULL weight bearing. Elevation of the lower leg is recommended to reduce swelling. Principal Discharge DX:	Unilateral primary osteoarthritis, left knee  Goal:	Decrease Pain, Improve Ambulation and Activities of Daily Living  Assessment and plan of treatment:	The patient will be seen in the office in 3 weeks for wound check. Tape will be removed at that time. Patient may shower after post-op day #5. The dressing is to be removed on post-op day #9(12/15/2018).  IF THE DRESSING BECOMES SOILED BEFORE THE REMOVAL DATE, CHANGE WITH A SIMILAR DRESSING. IF THE DRESSING BECOMES STAINED WITH DISCHARGE, CONTACT THE OFFICE FOR FURTHER DIRECTIONS. The patient will contact the office if the wound becomes red, has increasing pain, develops bleeding or discharge, an injury occurs, or has other concerns. The patient will continue PT consistent with total knee replacement. The patient will continue ASPIRIN 81mg TWICE daily for 6 weeks for blood clot prevention. The patient will take OXYCODONE AND TYLENOL for pain control and titrate according to prescription and patient needs. The patient will take SENNA-S while taking oxycodone to prevent narcotic associated constipation.  Additionally, increase water intake (drink at least 8 glasses of water daily) and try adding fiber to the diet by eating fruits, vegetables and foods that are rich in grains. If constipation is experienced, contact the medical/primary care provider to discuss further treatment options. The patient is FULL weight bearing. Elevation of the lower leg is recommended to reduce swelling.

## 2018-12-06 NOTE — DISCHARGE NOTE ADULT - PATIENT PORTAL LINK FT
You can access the [facility] Patient Portal, offered by [facility], by registering with the following website: [email or web address]

## 2018-12-06 NOTE — CONSULT NOTE ADULT - SUBJECTIVE AND OBJECTIVE BOX
PMD :Dr De Jesus       Patient is a 68y old  Male who presents with a chief complaint of knee pain for many years       HPI:  68 year old male present with left knee pain,started over 20 years ago after fall with meniscus problems and has been able to manage in the summer had steroid injection and pain ROM has been getting worse walking and going down the stairs , pain was  unrelieved with conservative treatments.  He is now schedule for left knee total joint replacement seen in pacu post surgery tolerated procedure well .       PAST MEDICAL & SURGICAL HISTORY:  BPH with elevated PSA  Lyme disease  Borderline diabetes mellitus: weight controlled  OA (osteoarthritis)  HTN (hypertension)  H/O prostate biopsy  S/P appendectomy  S/P umbilical hernia repair, follow-up exam  S/P inguinal hernia repair: right and left  S/P tonsillectomy and adenoidectomy  S/P LASIK surgery of both eyes  History of carpal tunnel release of both wrists: 2016      Social History:  Tabacco - ex smoker stopped in 1990   ETOH - social alcohol use rare   Illicit drug abuse - denies    FAMILY HISTORY:  Family history of diabetes mellitus (Sibling)  Family history of breast cancer (Mother)  Family history of pancreatic cancer (Father)  Family history of heart disease (Mother)      Allergies    No Known Allergies    Intolerances        HOME MEDICATIONS : reviewed records from PCP in the chart     REVIEW OF SYSTEMS:    CONSTITUTIONAL: No fever, weight loss, or fatigue  EYES: No eye pain, visual disturbances, or discharge  NECK: No pain or stiffness  RESPIRATORY: No cough, wheezing, chills or hemoptysis; No shortness of breath  CARDIOVASCULAR: No chest pain, palpitations, dizziness, or leg swelling  GASTROINTESTINAL: No abdominal or epigastric pain. No nausea, vomiting, or hematemesis; No diarrhea or constipation. No melena or hematochezia.  GENITOURINARY: No dysuria, frequency, hematuria, or incontinence  NEUROLOGICAL: No headaches, memory loss, loss of strength, numbness, or tremors  SKIN: No itching, burning, rashes, or lesions   LYMPH NODES: No enlarged glands  ENDOCRINE: No heat or cold intolerance; No hair loss  MUSCULOSKELETAL: knee pain   PSYCHIATRIC: No depression, anxiety, mood swings, or difficulty sleeping  HEME/LYMPH: No easy bruising, or bleeding gums  ALLERGY AND IMMUNOLOGIC: No hives or eczema    MEDICATIONS  (STANDING):  acetaminophen  IVPB .. 1000 milliGRAM(s) IV Intermittent <User Schedule>  ceFAZolin   IVPB 2000 milliGRAM(s) IV Intermittent <User Schedule>  lactated ringers. 1000 milliLiter(s) (125 mL/Hr) IV Continuous <Continuous>    MEDICATIONS  (PRN):  fentaNYL    Injectable 50 MICROGram(s) IV Push every 5 minutes PRN Severe Pain (7 - 10)  HYDROmorphone  Injectable 1 milliGRAM(s) IV Push every 10 minutes PRN Severe Pain (7 - 10)  ondansetron Injectable 4 milliGRAM(s) IV Push once PRN Nausea and/or Vomiting      Vital Signs Last 24 Hrs  T(C): 36.5 (06 Dec 2018 16:46), Max: 36.6 (06 Dec 2018 10:56)  T(F): 97.7 (06 Dec 2018 16:46), Max: 97.9 (06 Dec 2018 10:56)  HR: 59 (06 Dec 2018 17:45) (57 - 72)  BP: 116/62 (06 Dec 2018 17:45) (115/66 - 140/82)  BP(mean): --  RR: 17 (06 Dec 2018 17:45) (13 - 18)  SpO2: 98% (06 Dec 2018 17:45) (95% - 99%)    PHYSICAL EXAM:    GENERAL: NAD, well-groomed, well-developed  HEAD:  Atraumatic, Normocephalic  EYES: EOMI, PERRLA, conjunctiva and sclera clear  NECK: Supple, No JVD, Normal thyroid  NERVOUS SYSTEM:  Alert & Oriented X3, Good concentration; no motor deficits   CHEST/LUNG: CTA  b/l,  no rales, rhonchi, wheezing, or rubs  HEART: Regular rate and rhythm; S1 /S2 No murmurs, rubs  ABDOMEN: Soft, Nontender, Nondistended; Bowel sounds present  EXTREMITIES:  2+ Peripheral Pulses, No clubbing, cyanosis, or edema ,   LLE ace bandage in place   LABS:      Complete Blood Count + Automated Diff (11.23.18 @ 13:00)    WBC Count: 5.5 K/uL    RBC Count: 4.86 M/uL    Hemoglobin: 15.0 g/dL    Hematocrit: 44.2 %    Mean Cell Volume: 90.9 fl    Mean Cell Hemoglobin: 30.9 pg    Mean Cell Hemoglobin Conc: 33.9 g/dL    Red Cell Distrib Width: 13.2 %    Platelet Count - Automated: 250 K/uL    Basic Metabolic Panel (11.23.18 @ 13:00)    Sodium, Serum: 140 mmol/L    Potassium, Serum: 3.6 mmol/L    Chloride, Serum: 100 mmol/L    Carbon Dioxide, Serum: 26.0 mmol/L    Anion Gap, Serum: 14 mmol/L    Blood Urea Nitrogen, Serum: 13.0 mg/dL    Creatinine, Serum: 0.65 mg/dL    Glucose, Serum: 136 mg/dL    Calcium, Total Serum: 10.1 mg/dL

## 2018-12-07 VITALS
OXYGEN SATURATION: 94 % | RESPIRATION RATE: 18 BRPM | DIASTOLIC BLOOD PRESSURE: 81 MMHG | TEMPERATURE: 97 F | SYSTOLIC BLOOD PRESSURE: 142 MMHG | HEART RATE: 63 BPM

## 2018-12-07 LAB
ANION GAP SERPL CALC-SCNC: 12 MMOL/L — SIGNIFICANT CHANGE UP (ref 5–17)
BUN SERPL-MCNC: 12 MG/DL — SIGNIFICANT CHANGE UP (ref 8–20)
CALCIUM SERPL-MCNC: 9.7 MG/DL — SIGNIFICANT CHANGE UP (ref 8.6–10.2)
CHLORIDE SERPL-SCNC: 105 MMOL/L — SIGNIFICANT CHANGE UP (ref 98–107)
CO2 SERPL-SCNC: 27 MMOL/L — SIGNIFICANT CHANGE UP (ref 22–29)
CREAT SERPL-MCNC: 0.68 MG/DL — SIGNIFICANT CHANGE UP (ref 0.5–1.3)
GLUCOSE SERPL-MCNC: 209 MG/DL — HIGH (ref 70–115)
HCT VFR BLD CALC: 38 % — LOW (ref 42–52)
HGB BLD-MCNC: 12.9 G/DL — LOW (ref 14–18)
MCHC RBC-ENTMCNC: 30 PG — SIGNIFICANT CHANGE UP (ref 27–31)
MCHC RBC-ENTMCNC: 33.9 G/DL — SIGNIFICANT CHANGE UP (ref 32–36)
MCV RBC AUTO: 88.4 FL — SIGNIFICANT CHANGE UP (ref 80–94)
PLATELET # BLD AUTO: 257 K/UL — SIGNIFICANT CHANGE UP (ref 150–400)
POTASSIUM SERPL-MCNC: 4.3 MMOL/L — SIGNIFICANT CHANGE UP (ref 3.5–5.3)
POTASSIUM SERPL-SCNC: 4.3 MMOL/L — SIGNIFICANT CHANGE UP (ref 3.5–5.3)
RBC # BLD: 4.3 M/UL — LOW (ref 4.6–6.2)
RBC # FLD: 12.9 % — SIGNIFICANT CHANGE UP (ref 11–15.6)
SODIUM SERPL-SCNC: 144 MMOL/L — SIGNIFICANT CHANGE UP (ref 135–145)
WBC # BLD: 12.4 K/UL — HIGH (ref 4.8–10.8)
WBC # FLD AUTO: 12.4 K/UL — HIGH (ref 4.8–10.8)

## 2018-12-07 PROCEDURE — C1713: CPT

## 2018-12-07 PROCEDURE — 97167 OT EVAL HIGH COMPLEX 60 MIN: CPT

## 2018-12-07 PROCEDURE — 97163 PT EVAL HIGH COMPLEX 45 MIN: CPT

## 2018-12-07 PROCEDURE — 73560 X-RAY EXAM OF KNEE 1 OR 2: CPT

## 2018-12-07 PROCEDURE — C1776: CPT

## 2018-12-07 PROCEDURE — 82962 GLUCOSE BLOOD TEST: CPT

## 2018-12-07 PROCEDURE — 85027 COMPLETE CBC AUTOMATED: CPT

## 2018-12-07 PROCEDURE — 80048 BASIC METABOLIC PNL TOTAL CA: CPT

## 2018-12-07 PROCEDURE — 99232 SBSQ HOSP IP/OBS MODERATE 35: CPT

## 2018-12-07 PROCEDURE — 97110 THERAPEUTIC EXERCISES: CPT

## 2018-12-07 PROCEDURE — 36415 COLL VENOUS BLD VENIPUNCTURE: CPT

## 2018-12-07 PROCEDURE — 97116 GAIT TRAINING THERAPY: CPT

## 2018-12-07 RX ORDER — ATORVASTATIN CALCIUM 80 MG/1
40 TABLET, FILM COATED ORAL AT BEDTIME
Qty: 0 | Refills: 0 | Status: DISCONTINUED | OUTPATIENT
Start: 2018-12-07 | End: 2018-12-07

## 2018-12-07 RX ORDER — SENNOSIDES/DOCUSATE SODIUM 8.6MG-50MG
2 TABLET ORAL
Qty: 20 | Refills: 0 | OUTPATIENT
Start: 2018-12-07 | End: 2018-12-16

## 2018-12-07 RX ORDER — LISINOPRIL 2.5 MG/1
40 TABLET ORAL DAILY
Qty: 0 | Refills: 0 | Status: DISCONTINUED | OUTPATIENT
Start: 2018-12-08 | End: 2018-12-07

## 2018-12-07 RX ORDER — HYDROMORPHONE HYDROCHLORIDE 2 MG/ML
2 INJECTION INTRAMUSCULAR; INTRAVENOUS; SUBCUTANEOUS
Qty: 0 | Refills: 0 | Status: DISCONTINUED | OUTPATIENT
Start: 2018-12-07 | End: 2018-12-07

## 2018-12-07 RX ORDER — OMEPRAZOLE 10 MG/1
1 CAPSULE, DELAYED RELEASE ORAL
Qty: 45 | Refills: 0 | OUTPATIENT
Start: 2018-12-07 | End: 2019-01-20

## 2018-12-07 RX ORDER — ASPIRIN/CALCIUM CARB/MAGNESIUM 324 MG
81 TABLET ORAL
Qty: 0 | Refills: 0 | Status: DISCONTINUED | OUTPATIENT
Start: 2018-12-07 | End: 2018-12-07

## 2018-12-07 RX ORDER — GLUCOSAMINE HCL/CHONDROITIN SU 500-400 MG
0 CAPSULE ORAL
Qty: 0 | Refills: 0 | COMMUNITY

## 2018-12-07 RX ORDER — ASPIRIN/CALCIUM CARB/MAGNESIUM 324 MG
1 TABLET ORAL
Qty: 90 | Refills: 0 | OUTPATIENT
Start: 2018-12-07 | End: 2019-01-20

## 2018-12-07 RX ORDER — AMLODIPINE BESYLATE 2.5 MG/1
5 TABLET ORAL DAILY
Qty: 0 | Refills: 0 | Status: DISCONTINUED | OUTPATIENT
Start: 2018-12-08 | End: 2018-12-07

## 2018-12-07 RX ORDER — OXYCODONE HYDROCHLORIDE 5 MG/1
1 TABLET ORAL
Qty: 56 | Refills: 0 | OUTPATIENT
Start: 2018-12-07 | End: 2018-12-13

## 2018-12-07 RX ORDER — SODIUM CHLORIDE 9 MG/ML
3 INJECTION INTRAMUSCULAR; INTRAVENOUS; SUBCUTANEOUS EVERY 8 HOURS
Qty: 0 | Refills: 0 | Status: DISCONTINUED | OUTPATIENT
Start: 2018-12-07 | End: 2018-12-07

## 2018-12-07 RX ORDER — HYDROCHLOROTHIAZIDE 25 MG
25 TABLET ORAL DAILY
Qty: 0 | Refills: 0 | Status: DISCONTINUED | OUTPATIENT
Start: 2018-12-08 | End: 2018-12-07

## 2018-12-07 RX ADMIN — SODIUM CHLORIDE 100 MILLILITER(S): 9 INJECTION, SOLUTION INTRAVENOUS at 05:37

## 2018-12-07 RX ADMIN — Medication 300 MILLIGRAM(S): at 00:18

## 2018-12-07 RX ADMIN — OXYCODONE HYDROCHLORIDE 5 MILLIGRAM(S): 5 TABLET ORAL at 10:05

## 2018-12-07 RX ADMIN — Medication 100 MILLIGRAM(S): at 05:37

## 2018-12-07 RX ADMIN — OXYCODONE HYDROCHLORIDE 5 MILLIGRAM(S): 5 TABLET ORAL at 09:30

## 2018-12-07 RX ADMIN — Medication 975 MILLIGRAM(S): at 05:37

## 2018-12-07 RX ADMIN — Medication 975 MILLIGRAM(S): at 06:18

## 2018-12-07 RX ADMIN — Medication 325 MILLIGRAM(S): at 05:37

## 2018-12-07 RX ADMIN — Medication 100 MILLIGRAM(S): at 03:41

## 2018-12-07 NOTE — OCCUPATIONAL THERAPY INITIAL EVALUATION ADULT - ADDITIONAL COMMENTS
Pt lives in house with 3 JOVANNI and no steps inside; bedroom and bathroom are on main level. Bathroom has bathtub with curtains. Pt owns shower chair, 2 RWs, cane, commode, versaframe. Pt is right handed. Pt drives.

## 2018-12-07 NOTE — PHYSICAL THERAPY INITIAL EVALUATION ADULT - ADDITIONAL COMMENTS
Pt lives in a house with 3 steps to enter with 2 rails and  0 stairs inside.  Pt owns medical equipment: RW, SAC, Commode, shower Chair  Pt lives with: Alone

## 2018-12-07 NOTE — PROGRESS NOTE ADULT - SUBJECTIVE AND OBJECTIVE BOX
Internal Medicine Hospitalist Progress Note      CC : left knee pain s/p knee arthroplasty   no overnight events reported , pain controlled   denies dizziness , nausea or vomiting     ROS: as above, all remaining ROS are negative.       BACKGROUND:  MEDICATIONS  (STANDING):  acetaminophen   Tablet .. 975 milliGRAM(s) Oral every 8 hours  aspirin enteric coated 81 milliGRAM(s) Oral two times a day  atorvastatin 40 milliGRAM(s) Oral at bedtime  docusate sodium 100 milliGRAM(s) Oral three times a day  lactated ringers. 1000 milliLiter(s) (100 mL/Hr) IV Continuous <Continuous>  sodium chloride 0.9% lock flush 3 milliLiter(s) IV Push every 8 hours    MEDICATIONS  (PRN):  aluminum hydroxide/magnesium hydroxide/simethicone Suspension 30 milliLiter(s) Oral four times a day PRN Indigestion  HYDROmorphone   Tablet 2 milliGRAM(s) Oral every 3 hours PRN Severe Pain (7 - 10)  HYDROmorphone  Injectable 0.5 milliGRAM(s) IV Push every 4 hours PRN Severe Pain (7 - 10)  ketorolac   Injectable 15 milliGRAM(s) IV Push every 6 hours PRN Moderate Pain (4 - 6)  ondansetron Injectable 4 milliGRAM(s) IV Push every 6 hours PRN Nausea and/or Vomiting  oxyCODONE    IR 5 milliGRAM(s) Oral every 3 hours PRN Mild Pain (1 - 3)  oxyCODONE    IR 10 milliGRAM(s) Oral every 3 hours PRN Moderate Pain (4 - 6)  senna 2 Tablet(s) Oral at bedtime PRN Constipation    Allergies    No Known Allergies    Intolerances            VITALS:  Vital Signs Last 24 Hrs  T(C): 36.3 (07 Dec 2018 07:45), Max: 36.9 (07 Dec 2018 00:19)  T(F): 97.3 (07 Dec 2018 07:45), Max: 98.5 (07 Dec 2018 00:19)  HR: 63 (07 Dec 2018 07:45) (57 - 78)  BP: 142/81 (07 Dec 2018 07:45) (107/60 - 142/81)  BP(mean): --  RR: 18 (07 Dec 2018 07:45) (12 - 24)  SpO2: 94% (07 Dec 2018 07:45) (93% - 99%) Daily Height in cm: 170.18 (06 Dec 2018 10:56)    Daily       CAPILLARY BLOOD GLUCOSE      POCT Blood Glucose.: 134 mg/dL (06 Dec 2018 16:48)  POCT Blood Glucose.: 131 mg/dL (06 Dec 2018 14:54)  POCT Blood Glucose.: 132 mg/dL (06 Dec 2018 11:17)  I&O's Summary    06 Dec 2018 07:01  -  07 Dec 2018 07:00  --------------------------------------------------------  IN: 1500 mL / OUT: 2700 mL / NET: -1200 mL        PHYSICAL EXAM:      Constitutional: awake alert pleasant     Respiratory: CTa bilateral no wheezing     Cardiovascular: regular rate rythm S1 /S2     Gastrointestinal: soft no tenderness , BS positive     Extremities: no pretibial edema  , left knee dressing in plLABS:                        12.9   12.4  )-----------( 257      ( 07 Dec 2018 06:56 )             38.0     12-07    144  |  105  |  12.0  ----------------------------<  209<H>  4.3   |  27.0  |  0.68    Ca    9.7      07 Dec 2018 06:56          Radiology :

## 2018-12-07 NOTE — PHYSICAL THERAPY INITIAL EVALUATION ADULT - GAIT TRAINING, PT EVAL
Time Frame:   1  days   Goal:   Modified Independent with RW x 300 feet / Stairs: pt is modified independent with stairs

## 2018-12-07 NOTE — PROGRESS NOTE ADULT - PROBLEM SELECTOR PLAN 1
s/p left knee replacement   pain controlled , cont PT home care 'discharge today per ortho   pt is medically stable

## 2018-12-07 NOTE — PHYSICAL THERAPY INITIAL EVALUATION ADULT - ACTIVE RANGE OF MOTION EXAMINATION, REHAB EVAL
juan antonio. upper extremity Active ROM was WNL (within normal limits)/RLE Active ROM was WNL (within normal limits)/Left LE Active ROM was WFL (within functional limits)

## 2018-12-07 NOTE — PROGRESS NOTE ADULT - SUBJECTIVE AND OBJECTIVE BOX
Ortho Post Op Check    Name: JEFF VEGA    MR #: [id number]    Procedure: Left total knee arthroplasty   Surgeon: Dr Gonzales    PAST MEDICAL & SURGICAL HISTORY:  BPH with elevated PSA  Lyme disease  Borderline diabetes mellitus: weight controlled  OA (osteoarthritis)  HTN (hypertension)  H/O prostate biopsy  S/P appendectomy  S/P umbilical hernia repair, follow-up exam  S/P inguinal hernia repair: right and left  S/P tonsillectomy and adenoidectomy  S/P LASIK surgery of both eyes  History of carpal tunnel release of both wrists: 2016      Pt comfortable without complaints, pain controlled  Denies CP, SOB, N/V, numbness/tingling     General Exam:  Vital Signs Last 24 Hrs  T(C): 36.3 (12-07-18 @ 07:45), Max: 36.6 (12-07-18 @ 05:22)  T(F): 97.3 (12-07-18 @ 07:45), Max: 97.8 (12-07-18 @ 05:22)  HR: 63 (12-07-18 @ 07:45) (63 - 65)  BP: 142/81 (12-07-18 @ 07:45) (125/66 - 142/81)  BP(mean): --  RR: 18 (12-07-18 @ 07:45) (18 - 18)  SpO2: 94% (12-07-18 @ 07:45) (94% - 95%)    General: Pt Alert and oriented, NAD, controlled pain.  Left knee Dressings removed to reveal incision to be C/D/I. No bleeding.  Pulses: 2+ dorsalis pedis pulse. Cap refill < 2 sec.  Sensation: Grossly intact to light touch without deficit.  Motor: + EHL/FHL/TA/GS  Calf soft, supple and nontender                        12.9   12.4  )-----------( 257      ( 07 Dec 2018 06:56 )             38.0   07 Dec 2018 06:56    144    |  105    |  12.0   ----------------------------<  209    4.3     |  27.0   |  0.68     Ca    9.7        07 Dec 2018 06:56        Post-op X-Ray:  EXAM:  KNEE-LEFT                          PROCEDURE DATE:  12/06/2018          INTERPRETATION:  History: Status post knee replacement    Technique: 2 views of the left knee with comparison to  2/5/2010    Findings:    Patient status post total left knee replacement in good anatomic   alignment. Postsurgical changes and surgical drain in place.    Impression:    Status post left total knee replacement in good anatomic alignment      SOLE MAYES M.D., ATTENDING RADIOLOGIST  This documenthas been electronically signed. Dec  7 2018  6:57AM      A/P: 68yMale POD#1 s/p Left total knee arthroplasty   - Stable  - Pain Control  - DVT ppx: ASA 81mg PO BID x 6 weeks- will prescribe PPI since patient had history of gastritis 8 years ago from NSAIDS use  - Post op abx: Ancef and vanco given  - PT eval pending  - Weight bearing status: WBAT  - DC to home today at pt request

## 2018-12-10 ENCOUNTER — OTHER (OUTPATIENT)
Age: 68
End: 2018-12-10

## 2018-12-12 ENCOUNTER — OTHER (OUTPATIENT)
Age: 68
End: 2018-12-12

## 2018-12-17 ENCOUNTER — OTHER (OUTPATIENT)
Age: 68
End: 2018-12-17

## 2018-12-28 ENCOUNTER — APPOINTMENT (OUTPATIENT)
Dept: ORTHOPEDIC SURGERY | Facility: CLINIC | Age: 68
End: 2018-12-28
Payer: COMMERCIAL

## 2018-12-28 VITALS
SYSTOLIC BLOOD PRESSURE: 152 MMHG | WEIGHT: 194 LBS | DIASTOLIC BLOOD PRESSURE: 87 MMHG | BODY MASS INDEX: 30.45 KG/M2 | HEIGHT: 67 IN | HEART RATE: 76 BPM

## 2018-12-28 PROCEDURE — 73562 X-RAY EXAM OF KNEE 3: CPT | Mod: LT

## 2018-12-28 PROCEDURE — 99024 POSTOP FOLLOW-UP VISIT: CPT

## 2019-01-07 ENCOUNTER — OTHER (OUTPATIENT)
Age: 69
End: 2019-01-07

## 2019-01-29 ENCOUNTER — APPOINTMENT (OUTPATIENT)
Dept: ORTHOPEDIC SURGERY | Facility: CLINIC | Age: 69
End: 2019-01-29
Payer: COMMERCIAL

## 2019-01-29 VITALS
SYSTOLIC BLOOD PRESSURE: 164 MMHG | HEIGHT: 67 IN | WEIGHT: 194 LBS | DIASTOLIC BLOOD PRESSURE: 92 MMHG | BODY MASS INDEX: 30.45 KG/M2 | HEART RATE: 93 BPM

## 2019-01-29 PROCEDURE — 99024 POSTOP FOLLOW-UP VISIT: CPT

## 2019-01-29 PROCEDURE — 73562 X-RAY EXAM OF KNEE 3: CPT | Mod: LT

## 2019-01-29 RX ORDER — CIPROFLOXACIN AND DEXAMETHASONE 3; 1 MG/ML; MG/ML
0.3-0.1 SUSPENSION/ DROPS AURICULAR (OTIC) TWICE DAILY
Qty: 1 | Refills: 0 | Status: DISCONTINUED | COMMUNITY
Start: 2018-11-30 | End: 2019-01-29

## 2019-01-29 RX ORDER — AMOXICILLIN AND CLAVULANATE POTASSIUM 875; 125 MG/1; MG/1
875-125 TABLET, COATED ORAL
Qty: 14 | Refills: 0 | Status: DISCONTINUED | COMMUNITY
Start: 2018-11-30 | End: 2019-01-29

## 2019-01-29 NOTE — HISTORY OF PRESENT ILLNESS
[0] : no pain reported [Healed] : healed [Swelling] : swollen [Neuro Intact] : an unremarkable neurological exam [Vascular Intact] : ~T peripheral vascular exam normal [Negative Ovidio's] : maneuvers demonstrated a negative Ovidio's sign [Xray (Date:___)] : [unfilled] Xray -  [Doing Well] : is doing well [No Sign of Infection] : is showing no signs of infection [Adequate Pain Control] : has adequate pain control [Chills] : no chills [Constipation] : no constipation [Dysuria] : no dysuria [Fever] : no fever [Nausea] : no nausea [Vomiting] : no vomiting [Erythema] : not erythematous [Discharge] : absent of discharge [Dehiscence] : not dehisced [de-identified] : S/P Left TKR, COmputer-assisted tibial resection, [facility] procedure DOS:12/06/18. [de-identified] : pt is here for f/u of left TKA 8 weeks ago\par he is in out pt PT 3 times per week.\par the pain is controlled with tramadol. he finished Aspirin. is concerned about swelling and posterior knee pain with bending. he has been putting ace and heat\par \par  [de-identified] : Left knee examination reveals a well healing incision, ROM 3-95 degrees, ambulating with a cane.  [de-identified] : 3V xray of the left knee done in office today and demonstrates s/p TKA with implants in good positioning, no sign of wear, loosening or subsidence. \par  [de-identified] : I instructed pt to apply ice and use NSADs to improve swelling. pt will continue with  PT to increase ROM. 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

## 2019-02-04 ENCOUNTER — APPOINTMENT (OUTPATIENT)
Dept: INTERNAL MEDICINE | Facility: CLINIC | Age: 69
End: 2019-02-04
Payer: COMMERCIAL

## 2019-02-04 VITALS
HEART RATE: 71 BPM | WEIGHT: 198 LBS | SYSTOLIC BLOOD PRESSURE: 130 MMHG | HEIGHT: 67 IN | DIASTOLIC BLOOD PRESSURE: 82 MMHG | OXYGEN SATURATION: 97 % | BODY MASS INDEX: 31.08 KG/M2

## 2019-02-04 PROCEDURE — 99213 OFFICE O/P EST LOW 20 MIN: CPT | Mod: 25

## 2019-02-04 PROCEDURE — G0009: CPT

## 2019-02-04 PROCEDURE — 36415 COLL VENOUS BLD VENIPUNCTURE: CPT

## 2019-02-04 PROCEDURE — 90732 PPSV23 VACC 2 YRS+ SUBQ/IM: CPT

## 2019-02-04 RX ORDER — OXYCODONE 10 MG/1
10 TABLET ORAL
Qty: 60 | Refills: 0 | Status: DISCONTINUED | COMMUNITY
Start: 2018-12-28 | End: 2019-02-04

## 2019-02-04 RX ORDER — TRAMADOL HYDROCHLORIDE 50 MG/1
50 TABLET, COATED ORAL
Qty: 60 | Refills: 0 | Status: DISCONTINUED | COMMUNITY
Start: 2019-01-29 | End: 2019-02-04

## 2019-02-04 RX ORDER — HYDROMORPHONE HYDROCHLORIDE 2 MG/1
2 TABLET ORAL EVERY 6 HOURS
Qty: 40 | Refills: 0 | Status: DISCONTINUED | COMMUNITY
Start: 2018-12-12 | End: 2019-02-04

## 2019-02-04 RX ORDER — TRAMADOL HYDROCHLORIDE 50 MG/1
50 TABLET, COATED ORAL
Refills: 0 | Status: DISCONTINUED | COMMUNITY
End: 2019-02-04

## 2019-02-05 ENCOUNTER — RESULT REVIEW (OUTPATIENT)
Age: 69
End: 2019-02-05

## 2019-02-05 ENCOUNTER — TRANSCRIPTION ENCOUNTER (OUTPATIENT)
Age: 69
End: 2019-02-05

## 2019-02-05 LAB
ALBUMIN SERPL ELPH-MCNC: 4.8 G/DL
ALP BLD-CCNC: 91 U/L
ALT SERPL-CCNC: 14 U/L
ANION GAP SERPL CALC-SCNC: 13 MMOL/L
APPEARANCE: CLEAR
AST SERPL-CCNC: 14 U/L
BACTERIA UR CULT: NORMAL
BACTERIA: NEGATIVE
BASOPHILS # BLD AUTO: 0.06 K/UL
BASOPHILS NFR BLD AUTO: 1 %
BILIRUB SERPL-MCNC: 0.4 MG/DL
BILIRUBIN URINE: NEGATIVE
BLOOD URINE: NEGATIVE
BUN SERPL-MCNC: 14 MG/DL
CALCIUM SERPL-MCNC: 9.8 MG/DL
CHLORIDE SERPL-SCNC: 102 MMOL/L
CHOLEST SERPL-MCNC: 166 MG/DL
CHOLEST/HDLC SERPL: 2.6 RATIO
CO2 SERPL-SCNC: 24 MMOL/L
COLOR: YELLOW
CREAT SERPL-MCNC: 0.6 MG/DL
EOSINOPHIL # BLD AUTO: 0.14 K/UL
EOSINOPHIL NFR BLD AUTO: 2.4 %
GLUCOSE QUALITATIVE U: NEGATIVE MG/DL
GLUCOSE SERPL-MCNC: 124 MG/DL
HBA1C MFR BLD HPLC: 7.1 %
HCT VFR BLD CALC: 43.8 %
HDLC SERPL-MCNC: 64 MG/DL
HGB BLD-MCNC: 14 G/DL
HYALINE CASTS: 1 /LPF
IMM GRANULOCYTES NFR BLD AUTO: 0.2 %
KETONES URINE: NEGATIVE
LDLC SERPL CALC-MCNC: 76 MG/DL
LEUKOCYTE ESTERASE URINE: NEGATIVE
LYMPHOCYTES # BLD AUTO: 1.07 K/UL
LYMPHOCYTES NFR BLD AUTO: 18.1 %
MAGNESIUM SERPL-MCNC: 2 MG/DL
MAN DIFF?: NORMAL
MCHC RBC-ENTMCNC: 29 PG
MCHC RBC-ENTMCNC: 32 GM/DL
MCV RBC AUTO: 90.7 FL
MICROSCOPIC-UA: NORMAL
MONOCYTES # BLD AUTO: 0.49 K/UL
MONOCYTES NFR BLD AUTO: 8.3 %
NEUTROPHILS # BLD AUTO: 4.15 K/UL
NEUTROPHILS NFR BLD AUTO: 70 %
NITRITE URINE: NEGATIVE
PH URINE: 6
PLATELET # BLD AUTO: 361 K/UL
POTASSIUM SERPL-SCNC: 4.1 MMOL/L
PROT SERPL-MCNC: 7.3 G/DL
PROTEIN URINE: NEGATIVE MG/DL
PSA FREE FLD-MCNC: 18 %
PSA FREE SERPL-MCNC: 0.98 NG/ML
PSA SERPL-MCNC: 5.49 NG/ML
RBC # BLD: 4.83 M/UL
RBC # FLD: 14.4 %
RED BLOOD CELLS URINE: 1 /HPF
SODIUM SERPL-SCNC: 139 MMOL/L
SPECIFIC GRAVITY URINE: 1.02
SQUAMOUS EPITHELIAL CELLS: 0 /HPF
TRIGL SERPL-MCNC: 130 MG/DL
UROBILINOGEN URINE: NEGATIVE MG/DL
WBC # FLD AUTO: 5.92 K/UL
WHITE BLOOD CELLS URINE: 0 /HPF

## 2019-02-05 NOTE — ASSESSMENT
[FreeTextEntry1] : Labs will be sent out/further recommendations will be made based on lab results. Patient advised to continue present medications with diet/exercise and specialist follow up.Pneumovax given without reaction.Patient will return to the office in 3-4 months \par \par last colonoscopy was 7/2018, follow up in 3 years \par 24 urine was 11/2018/[facility] 10/2018\par specialists include\par 1. ophthalmology-Dr. Garcia 9/2018\par 2.urology-Dr. Handley\par 3.sees orthopedic on a p.r.n. basis-Dr. Gonzales\par 4.Dr. Lazcano at [place] (hand)-NO FOLLOW UP NEEDED\par 5.podiatry-Dr. Flores\par saw cardiology and was told to followup p.r.n., stress test 12/2018-Dr. Calloway\par shingles vaccine discussed\par Reports hepatitis C screening in the past as negative

## 2019-02-05 NOTE — HISTORY OF PRESENT ILLNESS
[None] : No weight lost attempts [Needs reinforcement, provided] : Patient needs reinforcement on understanding of disease, goals and obesity follow-up plan; reinforcement was provided [FreeTextEntry1] : Patient presents for followup on hypertension/hyperlipidemia/type 2 diabetes. Patient is currently fasting for today's labs and offers no acute complaints. Patient is currently on Lotrel/hydrochlorothiazide for hypertension, on Crestor for hyperlipidemia and is controlling his type 2 diabetes dietarily. \par -needs PSA/urine per \par -Status post left total knee replacement, currently doing PT\par

## 2019-02-27 ENCOUNTER — TRANSCRIPTION ENCOUNTER (OUTPATIENT)
Age: 69
End: 2019-02-27

## 2019-02-27 ENCOUNTER — APPOINTMENT (OUTPATIENT)
Dept: ORTHOPEDIC SURGERY | Facility: CLINIC | Age: 69
End: 2019-02-27
Payer: COMMERCIAL

## 2019-02-27 VITALS
DIASTOLIC BLOOD PRESSURE: 93 MMHG | SYSTOLIC BLOOD PRESSURE: 160 MMHG | HEIGHT: 67 IN | HEART RATE: 71 BPM | BODY MASS INDEX: 31.08 KG/M2 | WEIGHT: 198 LBS

## 2019-02-27 PROCEDURE — 99024 POSTOP FOLLOW-UP VISIT: CPT

## 2019-02-27 PROCEDURE — 73562 X-RAY EXAM OF KNEE 3: CPT

## 2019-02-27 NOTE — ADDENDUM
[FreeTextEntry1] : I, Marcus Carey, acted solely as a scribe for Dr. Dionte Gonzales on this date 02/27/2019.

## 2019-02-27 NOTE — HISTORY OF PRESENT ILLNESS
[Clean/Dry/Intact] : clean, dry and intact [Healed] : healed [Xray (Date:___)] : [unfilled] Xray -  [Doing Well] : is doing well [Excellent Pain Control] : has excellent pain control [No Sign of Infection] : is showing no signs of infection [Chills] : no chills [Constipation] : no constipation [Diarrhea] : no diarrhea [Dysuria] : no dysuria [Fever] : no fever [Nausea] : no nausea [Erythema] : not erythematous [Discharge] : absent of discharge [Swelling] : not swollen [Dehiscence] : not dehisced [de-identified] : S/P Left TKR, COmputer-assisted tibial resection, OrthAlign procedure DOS:12/06/18.  [de-identified] : 68 year old male presents S/P Left TKR, COmputer-assisted tibial resection, OrthAlign procedure DOS:12/06/18. He is progressing well. His pain is currently manageable, but notes residual left knee pain. He notes pain in the left knee, especially at night when sleeping. He also notes feelings of warmness and tightness in the left knee. He currently exercises with weights at home, including weighted leg presses. He is ambulating freely without an ambulatory aid. He takes Motrin for pain, and ices his knee regularly  [de-identified] : Left knee midline incision is well-healed with no signs of infection. Full, painless range of motion 0 degrees of extension to 120 degrees of flexion.  [de-identified] : 3V xray of the left knee done in office today and reviewed by Dr. Dionte Gonzales demonstrates s/p TKR with implants in good positioning, no sign of wear, loosening or subsidence.  [de-identified] : 68 year old male presents S/P Left TKR, COmputer-assisted tibial resection, OrthAlign procedure DOS:12/06/18. Xrays were reviewed and the patient was reassured that their TKR components are in good position with no signs of loosening or wear. I discussed the importance of antibiotic use with any dental work. We discussed activity modifications. He can continue his Motrin prn, and should continue his icing regimen. A prescription for Physical Therapy was provided. A return to work letter was also provided. He will follow-up in 1 year for radiographic surveillance.

## 2019-03-06 ENCOUNTER — FORM ENCOUNTER (OUTPATIENT)
Age: 69
End: 2019-03-06

## 2019-04-12 ENCOUNTER — RX RENEWAL (OUTPATIENT)
Age: 69
End: 2019-04-12

## 2019-07-31 ENCOUNTER — APPOINTMENT (OUTPATIENT)
Dept: ORTHOPEDIC SURGERY | Facility: CLINIC | Age: 69
End: 2019-07-31
Payer: COMMERCIAL

## 2019-07-31 VITALS
BODY MASS INDEX: 31.08 KG/M2 | HEIGHT: 67 IN | HEART RATE: 63 BPM | DIASTOLIC BLOOD PRESSURE: 83 MMHG | SYSTOLIC BLOOD PRESSURE: 144 MMHG | WEIGHT: 198 LBS

## 2019-07-31 DIAGNOSIS — Z96.652 PRESENCE OF LEFT ARTIFICIAL KNEE JOINT: ICD-10-CM

## 2019-07-31 DIAGNOSIS — Z96.652 AFTERCARE FOLLOWING JOINT REPLACEMENT SURGERY: ICD-10-CM

## 2019-07-31 DIAGNOSIS — Z47.1 AFTERCARE FOLLOWING JOINT REPLACEMENT SURGERY: ICD-10-CM

## 2019-07-31 PROCEDURE — 99214 OFFICE O/P EST MOD 30 MIN: CPT

## 2019-07-31 PROCEDURE — 73562 X-RAY EXAM OF KNEE 3: CPT | Mod: LT

## 2019-07-31 NOTE — HISTORY OF PRESENT ILLNESS
[Stable] : stable [1] : an average pain level of 1/10 [0] : a minimum pain level of 0/10 [2] : a maximum pain level of 2/10 [Intermit.] : ~He/She~ states the symptoms seem to be intermittent [Standing] : standing [Bending] : worsened by bending [Walking] : worsened by walking [Recumbency] : relieved by recumbency [Rest] : relieved by rest [de-identified] : 69 year old male presents for evaluation S/P Left TKR DOS 12/6/2018.  Patient states that he had severe pain and swelling 2 months ago, which has improved greatly as of today. Mild pain and swelling is still present in the posterior knee. Pain is worse with navigating stairs. He does report left knee stiffness. 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 He has finished outpatient PT. Overall he is happy with his surgical outcome.  [de-identified] : stairs

## 2019-07-31 NOTE — REVIEW OF SYSTEMS
[Negative] : Heme/Lymph [Joint Pain] : joint pain [Joint Swelling] : joint swelling [Joint Stiffness] : joint stiffness

## 2019-07-31 NOTE — PHYSICAL EXAM
[LE] : Sensory: Intact in bilateral lower extremities [ALL] : dorsalis pedis, posterior tibial, femoral, popliteal, and radial 2+ and symmetric bilaterally [Normal] : Alert and in no acute distress [Antalgic] : not antalgic [Poor Appearance] : well-appearing [Acute Distress] : not in acute distress [Obese] : not obese [de-identified] : GENERAL APPEARANCE: Well nourished and hydrated, pleasant, alert, and oriented x 3. Appears their stated age. \par HEENT: Normocephalic, extraocular eye motion intact. Nasal septum midline. Oral cavity clear. External auditory canal clear. \par RESPIRATORY: Breath sounds clear and audible in all lobes. No wheezing, No accessory muscle use.\par CARDIOVASCULAR: 1+ LE edema. No varicosities. Pedal pulses are palpable.\par NEUROLOGIC: Sensation is normal, no muscle weakness in the upper or lower extremities.\par DERMATOLOGIC: No apparent skin lesions, moist, warm, no rash.\par SPINE: Cervical spine appears normal and moves freely; thoracic spine appears normal and moves freely; lumbosacral spine appears normal and moves freely, normal, nontender.\par MUSCULOSKELETAL: Hands, wrists, and elbows are normal and move freely, shoulders are normal and move freely.  [de-identified] : Left knee midline incision is well-healed with no signs of infection. Painless range of motion 0 degrees of extension to 100 degrees of flexion, slight effusion.  [de-identified] : 3V xray of the left knee done in office today and reviewed by Dr. Dionte Gonzales demonstrates s/p TKR with implants in good positioning, no sign of wear, loosening or subsidence.

## 2019-07-31 NOTE — ADDENDUM
[FreeTextEntry1] : I, Marcus Carey, acted solely as a scribe for Dr. Dionte Gonzales on this date 07/31/2019.

## 2019-07-31 NOTE — DISCUSSION/SUMMARY
[de-identified] : 69 year old male S/P Left TKR DOS 12/6/2018. He is about 8 months from surgery, with some persistent left knee stiffness. I recommended against DEVEN at this time. He should continue with PT and low-impact exercise, including bicycling, to work on ROM. I provided him with a prescription for outpatient PT. Xrays were reviewed and the patient was reassured that their TKA components are in good position with no signs of loosening or wear. I discussed the importance of antibiotic use with any dental work. F/U annually for radiographic surveillance. \par \par A knee replacement means resurfacing of all 3 surfaces of the patella, the femur, and tibia with metal and plastic parts. The prosthetic parts are usually cemented into position and well outpatient range of motion from full extension to about 120° of flexion. The postoperative motion, however, is determined by multiple factors, the most important of which is preoperative motion. In general, the better motion preoperatively, the better the motion postoperatively. The operation, pending medical clearance, gently requires hospitalization of 3-4 days for one knee, 5-7 days for bilateral knee replacements. In general, we prefer to perform the procedure under spinal anesthesia with femoral nerve block and occasional single shot sciatic nerve block. We may ask a patient to give 2 units of blood for bilateral total knee arthroplasties, for one knee we institute a normogram which may include administration of preoperative Procrit. The operative procedure takes probably 1-2 hours. The operation requires a straight incision anywhere from 5-7 inches down from the knee. Postoperatively, the patient is positioned in a continuous passive motion machine within the first 24 hours and is walking the day after surgery. The first couple days are very painful and the pain medication will alleviate, but not eliminate the pain. The patient must really push hard to get range of motion. Our goal for having a person go home as that the range of motion is approximately 0-90° of flexion and that they can walk with a walker or cane. A walking aid is to be dispensed once the patient is secure enough. In general there, there is no cast or brace required with routine knee replacement. In the long term, we do not encourage our patients to run for the sake of running, although pending their preoperative status, we often allow patient to play doubles tennis or comparative activities. We also allowed them to do gentle intermediate downhill skiing if they are truly an expert skier. Biking is encouraged as well swimming. The followup periods are usually 3 weeks, 6 weeks, 3 months, and yearly intervals. Potential complications with total knee replacement included anesthetic complications and death, infection around 1%, nerve damage, by which means peroneal nerve palsy, footdrop or flapping foot with ambulation. This is particularly more apt to occur in the patient with a valgus or knock-knee deformity. The incidence can be quite high in this particular patient population. There will be areas of skin numbness, but this is not an untoward effect nor do we consider it a complication. Other potential complications include dislocation of the patella component, usually less than 2%; loosening of the tibial or femoral component is much more infrequent. Most often this occurs with infection or long-term use. Patient of extreme risk including markedly overweight patient's may be more prone to prosthetic wear. Major blood vessel damage is also extremely rare. Directly because of the anatomic proximity of the popliteal artery this could be lacerated with subsequent repair required. Be it unlikely, disruption of the popliteal artery could theoretically result in amputation. Similarly, infection could theoretically result in amputation if one were to grow out of an organism that cannot be controlled with antibiotics. General medical complications include phlebitis, for which we would prophylactically anticoagulate patients, but could still occur, and fatal pulmonary embolus which has been reported. Cardiovascular problems, such as heart attack or ischemia are always a concern with such hemodynamic changes in the blood vascular system. Other General complications are rare, but anything medicine could theoretically happen. I think the patient understands the risk benefit ratio of total knee replacement and will think about whether there like to pursue with an operation or nonoperative treatment program. I reviewed the plan of care as well as a model of a total knee implant equivalent to the one that will be used for their total knee joint replacement. The patient agreed to the plan of care as well as the use of implants for their knee total joint replacement.

## 2019-08-06 ENCOUNTER — APPOINTMENT (OUTPATIENT)
Dept: INTERNAL MEDICINE | Facility: CLINIC | Age: 69
End: 2019-08-06
Payer: COMMERCIAL

## 2019-08-06 VITALS
SYSTOLIC BLOOD PRESSURE: 120 MMHG | HEIGHT: 67 IN | OXYGEN SATURATION: 97 % | BODY MASS INDEX: 32.33 KG/M2 | DIASTOLIC BLOOD PRESSURE: 80 MMHG | WEIGHT: 206 LBS | HEART RATE: 63 BPM

## 2019-08-06 DIAGNOSIS — Z92.29 PERSONAL HISTORY OF OTHER DRUG THERAPY: ICD-10-CM

## 2019-08-06 PROCEDURE — 36415 COLL VENOUS BLD VENIPUNCTURE: CPT

## 2019-08-06 PROCEDURE — 99213 OFFICE O/P EST LOW 20 MIN: CPT | Mod: 25

## 2019-08-07 ENCOUNTER — RESULT REVIEW (OUTPATIENT)
Age: 69
End: 2019-08-07

## 2019-08-07 ENCOUNTER — TRANSCRIPTION ENCOUNTER (OUTPATIENT)
Age: 69
End: 2019-08-07

## 2019-08-07 LAB
ALBUMIN SERPL ELPH-MCNC: 4.9 G/DL
ALP BLD-CCNC: 84 U/L
ALT SERPL-CCNC: 18 U/L
ANION GAP SERPL CALC-SCNC: 16 MMOL/L
AST SERPL-CCNC: 18 U/L
BASOPHILS # BLD AUTO: 0.09 K/UL
BASOPHILS NFR BLD AUTO: 1.4 %
BILIRUB SERPL-MCNC: 0.6 MG/DL
BUN SERPL-MCNC: 13 MG/DL
CALCIUM SERPL-MCNC: 9.7 MG/DL
CHLORIDE SERPL-SCNC: 101 MMOL/L
CHOLEST SERPL-MCNC: 166 MG/DL
CHOLEST/HDLC SERPL: 3 RATIO
CO2 SERPL-SCNC: 22 MMOL/L
CREAT SERPL-MCNC: 0.69 MG/DL
EOSINOPHIL # BLD AUTO: 0.16 K/UL
EOSINOPHIL NFR BLD AUTO: 2.4 %
ESTIMATED AVERAGE GLUCOSE: 148 MG/DL
GLUCOSE SERPL-MCNC: 153 MG/DL
HBA1C MFR BLD HPLC: 6.8 %
HCT VFR BLD CALC: 44.3 %
HDLC SERPL-MCNC: 56 MG/DL
HGB BLD-MCNC: 14.9 G/DL
IMM GRANULOCYTES NFR BLD AUTO: 0.2 %
LDLC SERPL CALC-MCNC: 83 MG/DL
LYMPHOCYTES # BLD AUTO: 0.86 K/UL
LYMPHOCYTES NFR BLD AUTO: 13.1 %
MAGNESIUM SERPL-MCNC: 2.1 MG/DL
MAN DIFF?: NORMAL
MCHC RBC-ENTMCNC: 30.3 PG
MCHC RBC-ENTMCNC: 33.6 GM/DL
MCV RBC AUTO: 90 FL
MONOCYTES # BLD AUTO: 0.74 K/UL
MONOCYTES NFR BLD AUTO: 11.3 %
NEUTROPHILS # BLD AUTO: 4.71 K/UL
NEUTROPHILS NFR BLD AUTO: 71.6 %
PLATELET # BLD AUTO: 264 K/UL
POTASSIUM SERPL-SCNC: 3.8 MMOL/L
PROT SERPL-MCNC: 7.1 G/DL
PSA FREE FLD-MCNC: 20 %
PSA FREE SERPL-MCNC: 1.18 NG/ML
PSA SERPL-MCNC: 6.02 NG/ML
RBC # BLD: 4.92 M/UL
RBC # FLD: 13.2 %
SODIUM SERPL-SCNC: 139 MMOL/L
TRIGL SERPL-MCNC: 135 MG/DL
WBC # FLD AUTO: 6.57 K/UL

## 2019-08-07 NOTE — HEALTH RISK ASSESSMENT
[Yes] : Yes [Monthly or less (1 pt)] : Monthly or less (1 point) [1 or 2 (0 pts)] : 1 or 2 (0 points) [Never (0 pts)] : Never (0 points) [No] : In the past 12 months have you used drugs other than those required for medical reasons? No [Audit-CScore] : 1 point

## 2019-08-07 NOTE — ASSESSMENT
[FreeTextEntry1] : Labs will be sent out/further recommendations will be made based on lab results. Patient advised to continue present medications with diet/exercise and specialist follow up.Patient will return to the office in 3-4 months  for CP\par \par last colonoscopy was 7/2018, follow up in 3 years \par 24 urine was 11/2018/[facility] 10/2018\par specialists include\par 1. ophthalmology-Dr. Garcia 9/2018\par 2.urology-Dr. Handley\par 3.sees orthopedic on a p.r.n. basis-Dr. Gonzales\par 4.Dr. Lazcano at [facility] (hand)-NO FOLLOW UP NEEDED\par 5.podiatry-Dr. Flores\par saw cardiology and was told to followup p.r.n., stress test 12/2018-Dr. Calloway\par shingles vaccine discussed\par Reports hepatitis C screening in the past as negative

## 2019-08-07 NOTE — HISTORY OF PRESENT ILLNESS
[FreeTextEntry1] : Patient presents for followup on hypertension/hyperlipidemia/type 2 diabetes. Patient is currently fasting for today's labs and offers no acute complaints. Patient is currently on Lotrel/hydrochlorothiazide for hypertension, on Crestor for hyperlipidemia and is controlling his type 2 diabetes dietarily. \par -needs PSA per \par \par

## 2019-08-28 ENCOUNTER — RX RENEWAL (OUTPATIENT)
Age: 69
End: 2019-08-28

## 2019-10-02 ENCOUNTER — TRANSCRIPTION ENCOUNTER (OUTPATIENT)
Age: 69
End: 2019-10-02

## 2019-10-09 ENCOUNTER — RX RENEWAL (OUTPATIENT)
Age: 69
End: 2019-10-09

## 2019-10-10 ENCOUNTER — RX RENEWAL (OUTPATIENT)
Age: 69
End: 2019-10-10

## 2019-11-19 ENCOUNTER — APPOINTMENT (OUTPATIENT)
Dept: INTERNAL MEDICINE | Facility: CLINIC | Age: 69
End: 2019-11-19
Payer: COMMERCIAL

## 2019-11-19 VITALS
HEART RATE: 65 BPM | SYSTOLIC BLOOD PRESSURE: 120 MMHG | DIASTOLIC BLOOD PRESSURE: 80 MMHG | RESPIRATION RATE: 13 BRPM | WEIGHT: 211 LBS | HEIGHT: 67 IN | BODY MASS INDEX: 33.12 KG/M2

## 2019-11-19 DIAGNOSIS — M54.12 RADICULOPATHY, CERVICAL REGION: ICD-10-CM

## 2019-11-19 DIAGNOSIS — N28.9 DISORDER OF KIDNEY AND URETER, UNSPECIFIED: ICD-10-CM

## 2019-11-19 DIAGNOSIS — H60.90 UNSPECIFIED OTITIS EXTERNA, UNSPECIFIED EAR: ICD-10-CM

## 2019-11-19 DIAGNOSIS — Z86.69 PERSONAL HISTORY OF OTHER DISEASES OF THE NERVOUS SYSTEM AND SENSE ORGANS: ICD-10-CM

## 2019-11-19 DIAGNOSIS — Z01.818 ENCOUNTER FOR OTHER PREPROCEDURAL EXAMINATION: ICD-10-CM

## 2019-11-19 DIAGNOSIS — Z01.810 ENCOUNTER FOR PREPROCEDURAL CARDIOVASCULAR EXAMINATION: ICD-10-CM

## 2019-11-19 PROCEDURE — 99397 PER PM REEVAL EST PAT 65+ YR: CPT | Mod: 25

## 2019-11-19 PROCEDURE — G0444 DEPRESSION SCREEN ANNUAL: CPT

## 2019-11-19 PROCEDURE — G0442 ANNUAL ALCOHOL SCREEN 15 MIN: CPT

## 2019-11-19 PROCEDURE — 36415 COLL VENOUS BLD VENIPUNCTURE: CPT

## 2019-11-19 NOTE — ASSESSMENT
[FreeTextEntry1] : 69-year-old male with history of hypertension which is controlled with present medications, hyperlipidemia which is also controlled and type 2 diabetes with most recent hemoglobin A1c ay 6.8.\par  \par Currently medically stable without any active medical issues.\par \par Patient told if he diabetes control is no  better he will need medication.\par \par Patient continues to need lifestyle changes with diet exercise and weight loss.\par He is to followup with urology as scheduled\par He is to continue present medications\par \par Colonoscopy August 2018 with followup in 3 years secondary to history of polyps\par Ophthalmology yearly. october 2019 with no diabetic retinopathy.\par \par All of the vaccines up-to-date\par influenza vaccine. He received at work\par \par Venipuncture done today in the office\par \par Followup in 3 months

## 2019-11-19 NOTE — HEALTH RISK ASSESSMENT
[Good] : ~his/her~  mood as  good [No falls in past year] : Patient reported no falls in the past year [0] : 2) Feeling down, depressed, or hopeless: Not at all (0) [None] : None [Alone] : lives alone [Employed] : employed [Single] : single [# Of Children ___] : has [unfilled] children [Feels Safe at Home] : Feels safe at home [Fully functional (bathing, dressing, toileting, transferring, walking, feeding)] : Fully functional (bathing, dressing, toileting, transferring, walking, feeding) [Fully functional (using the telephone, shopping, preparing meals, housekeeping, doing laundry, using] : Fully functional and needs no help or supervision to perform IADLs (using the telephone, shopping, preparing meals, housekeeping, doing laundry, using transportation, managing medications and managing finances) [Smoke Detector] : smoke detector [Seat Belt] :  uses seat belt [Carbon Monoxide Detector] : carbon monoxide detector [Sunscreen] : uses sunscreen [Designated Healthcare Proxy] : Designated healthcare proxy [Discussed at today's visit] : Advance Directives Discussed at today's visit [Fair] : ~his/her~ current health as fair  [Yes] : Yes [Monthly or less (1 pt)] : Monthly or less (1 point) [1 or 2 (0 pts)] : 1 or 2 (0 points) [Never (0 pts)] : Never (0 points) [No] : In the past 12 months have you used drugs other than those required for medical reasons? 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

## 2019-11-19 NOTE — COUNSELING
[Weight management counseling provided] : Weight management [Healthy eating counseling provided] : healthy eating [Activity counseling provided] : activity [Decrease Portions] : Decrease food portions [Low Fat Diet] : Low fat diet [Walking] : Walking [None] : None [Needs reinforcement, provided] : Patient needs reinforcement on understanding lifestyle changes and  the steps needed to achieve self management goals and reinforcement was provided

## 2019-11-19 NOTE — PHYSICAL EXAM
[General Appearance - Alert] : alert [General Appearance - In No Acute Distress] : in no acute distress [Sclera] : the sclera and conjunctiva were normal [PERRL With Normal Accommodation] : pupils were equal in size, round, and reactive to light [Outer Ear] : the ears and nose were normal in appearance [Extraocular Movements] : extraocular movements were intact [Oropharynx] : the oropharynx was normal [Neck Appearance] : the appearance of the neck was normal [Neck Cervical Mass (___cm)] : no neck mass was observed [Thyroid Diffuse Enlargement] : the thyroid was not enlarged [Thyroid Nodule] : there were no palpable thyroid nodules [Jugular Venous Distention Increased] : there was no jugular-venous distention [Heart Sounds] : normal S1 and S2 [Heart Rate And Rhythm] : heart rate was normal and rhythm regular [Auscultation Breath Sounds / Voice Sounds] : lungs were clear to auscultation bilaterally [Murmurs] : no murmurs [Heart Sounds Gallop] : no gallops [Heart Sounds Pericardial Friction Rub] : no pericardial rub [Abdominal Aorta] : the abdominal aorta was normal [Arterial Pulses Carotid] : carotid pulses were normal with no bruits [Full Pulse] : the pedal pulses are present [Arterial Pulses Femoral] : femoral pulses were normal without bruits [Bowel Sounds] : normal bowel sounds [Edema] : there was no peripheral edema [Veins - Varicosity Changes] : there were no varicosital changes [Abdomen Tenderness] : non-tender [Abdomen Soft] : soft [Abdomen Mass (___ Cm)] : no abdominal mass palpated [Cervical Lymph Nodes Enlarged Anterior Bilaterally] : anterior cervical [Cervical Lymph Nodes Enlarged Posterior Bilaterally] : posterior cervical [Supraclavicular Lymph Nodes Enlarged Bilaterally] : supraclavicular [Axillary Lymph Nodes Enlarged Bilaterally] : axillary [Femoral Lymph Nodes Enlarged Bilaterally] : femoral [Inguinal Lymph Nodes Enlarged Bilaterally] : inguinal [Abnormal Walk] : normal gait [No Spinal Tenderness] : no spinal tenderness [Nail Clubbing] : no clubbing  or cyanosis of the fingernails [Musculoskeletal - Swelling] : no joint swelling seen [Skin Color & Pigmentation] : normal skin color and pigmentation [Motor Tone] : muscle strength and tone were normal [Skin Turgor] : normal skin turgor [] : no rash [Right Foot Was Examined] : right foot was examined [Left Foot Was Examined] : left foot was examined [Normal Appearance] : normal in appearance [Normal in Appearance] : normal in appearance [Normal] : normal [2+] : 2+ in the dorsalis pedis [Cranial Nerves] : cranial nerves 2-12 were intact [No Focal Deficits] : no focal deficits [Oriented To Time, Place, And Person] : oriented to person, place, and time [Impaired Insight] : insight and judgment were intact [Affect] : the affect was normal [FreeTextEntry1] : Globus [#1 Diminished] : number 1 was normal [#3 Diminished] : number 3 was normal [#2 Diminished] : number 2 was normal [#6 Diminished] : number 6 was normal [#5 Diminished] : number 5 was normal [#4 Diminished] : number 4 was normal [#9 Diminished] : number 9 was normal [#8 Diminished] : number 8 was normal [#7 Diminished] : number 7 was normal [#10 Diminished] : number 10 was normal [Over the Past 2 Weeks, Have You Felt Down, Depressed, or Hopeless?] : 1.) Over the past 2 weeks, have you felt down, depressed, or hopeless? No [Over the Past 2 Weeks, Have You Felt Little Interest or Pleasure Doing Things?] : 2.) Over the past 2 weeks, have you felt little interest or pleasure doing things? No

## 2019-11-20 ENCOUNTER — TRANSCRIPTION ENCOUNTER (OUTPATIENT)
Age: 69
End: 2019-11-20

## 2019-11-20 ENCOUNTER — RESULT REVIEW (OUTPATIENT)
Age: 69
End: 2019-11-20

## 2019-11-20 LAB
ALBUMIN SERPL ELPH-MCNC: 5 G/DL
ALP BLD-CCNC: 92 U/L
ALT SERPL-CCNC: 27 U/L
ANION GAP SERPL CALC-SCNC: 17 MMOL/L
APPEARANCE: CLEAR
AST SERPL-CCNC: 21 U/L
BACTERIA: NEGATIVE
BASOPHILS # BLD AUTO: 0.1 K/UL
BASOPHILS NFR BLD AUTO: 1.1 %
BILIRUB SERPL-MCNC: 0.4 MG/DL
BILIRUBIN URINE: NEGATIVE
BLOOD URINE: NEGATIVE
BUN SERPL-MCNC: 15 MG/DL
CALCIUM SERPL-MCNC: 10.1 MG/DL
CHLORIDE SERPL-SCNC: 99 MMOL/L
CHOLEST SERPL-MCNC: 169 MG/DL
CHOLEST/HDLC SERPL: 2.5 RATIO
CO2 SERPL-SCNC: 22 MMOL/L
COLOR: YELLOW
CREAT SERPL-MCNC: 0.67 MG/DL
CREAT SPEC-SCNC: 74 MG/DL
EOSINOPHIL # BLD AUTO: 0.12 K/UL
EOSINOPHIL NFR BLD AUTO: 1.3 %
ESTIMATED AVERAGE GLUCOSE: 163 MG/DL
GLUCOSE QUALITATIVE U: NEGATIVE
GLUCOSE SERPL-MCNC: 151 MG/DL
HBA1C MFR BLD HPLC: 7.3 %
HCT VFR BLD CALC: 47.4 %
HDLC SERPL-MCNC: 69 MG/DL
HGB BLD-MCNC: 15.6 G/DL
HYALINE CASTS: 0 /LPF
IMM GRANULOCYTES NFR BLD AUTO: 0.3 %
KETONES URINE: NEGATIVE
LDLC SERPL CALC-MCNC: 74 MG/DL
LEUKOCYTE ESTERASE URINE: NEGATIVE
LYMPHOCYTES # BLD AUTO: 1.09 K/UL
LYMPHOCYTES NFR BLD AUTO: 11.8 %
MAGNESIUM SERPL-MCNC: 2.2 MG/DL
MAN DIFF?: NORMAL
MCHC RBC-ENTMCNC: 30.5 PG
MCHC RBC-ENTMCNC: 32.9 GM/DL
MCV RBC AUTO: 92.8 FL
MICROALBUMIN 24H UR DL<=1MG/L-MCNC: 4.2 MG/DL
MICROALBUMIN/CREAT 24H UR-RTO: 57 MG/G
MICROSCOPIC-UA: NORMAL
MONOCYTES # BLD AUTO: 0.79 K/UL
MONOCYTES NFR BLD AUTO: 8.5 %
NEUTROPHILS # BLD AUTO: 7.14 K/UL
NEUTROPHILS NFR BLD AUTO: 77 %
NITRITE URINE: NEGATIVE
PH URINE: 6.5
PLATELET # BLD AUTO: 312 K/UL
POTASSIUM SERPL-SCNC: 4.2 MMOL/L
PROT SERPL-MCNC: 7.6 G/DL
PROTEIN URINE: NORMAL
RBC # BLD: 5.11 M/UL
RBC # FLD: 13 %
RED BLOOD CELLS URINE: 1 /HPF
SODIUM SERPL-SCNC: 138 MMOL/L
SPECIFIC GRAVITY URINE: 1.02
SQUAMOUS EPITHELIAL CELLS: 1 /HPF
TRIGL SERPL-MCNC: 128 MG/DL
UROBILINOGEN URINE: NORMAL
WBC # FLD AUTO: 9.27 K/UL
WHITE BLOOD CELLS URINE: 0 /HPF

## 2019-12-09 LAB
CREAT 24H UR-MCNC: 1.9 G/24 H
CREAT ?TM UR-MCNC: 81 MG/DL
PROT 24H UR-MRATE: 9 MG/DL
PROT ?TM UR-MCNC: 24 HR
PROT UR-MCNC: 207 MG/24 H
SPECIMEN VOL 24H UR: 2300 ML

## 2019-12-10 ENCOUNTER — RESULT REVIEW (OUTPATIENT)
Age: 69
End: 2019-12-10

## 2019-12-10 ENCOUNTER — APPOINTMENT (OUTPATIENT)
Dept: CARDIOLOGY | Facility: CLINIC | Age: 69
End: 2019-12-10
Payer: COMMERCIAL

## 2019-12-10 ENCOUNTER — NON-APPOINTMENT (OUTPATIENT)
Age: 69
End: 2019-12-10

## 2019-12-10 ENCOUNTER — TRANSCRIPTION ENCOUNTER (OUTPATIENT)
Age: 69
End: 2019-12-10

## 2019-12-10 VITALS
WEIGHT: 207 LBS | OXYGEN SATURATION: 97 % | HEART RATE: 73 BPM | BODY MASS INDEX: 32.49 KG/M2 | SYSTOLIC BLOOD PRESSURE: 132 MMHG | DIASTOLIC BLOOD PRESSURE: 78 MMHG | HEIGHT: 67 IN

## 2019-12-10 PROCEDURE — 93000 ELECTROCARDIOGRAM COMPLETE: CPT

## 2019-12-10 PROCEDURE — 99214 OFFICE O/P EST MOD 30 MIN: CPT

## 2019-12-16 ENCOUNTER — APPOINTMENT (OUTPATIENT)
Dept: INTERNAL MEDICINE | Facility: CLINIC | Age: 69
End: 2019-12-16
Payer: COMMERCIAL

## 2019-12-16 VITALS — DIASTOLIC BLOOD PRESSURE: 80 MMHG | HEART RATE: 72 BPM | SYSTOLIC BLOOD PRESSURE: 120 MMHG

## 2019-12-16 PROCEDURE — 99213 OFFICE O/P EST LOW 20 MIN: CPT

## 2019-12-16 NOTE — PHYSICAL EXAM
[No Acute Distress] : no acute distress [Normal Outer Ear/Nose] : the outer ears and nose were normal in appearance [Normal Oropharynx] : the oropharynx was normal [Normal Nasal Mucosa] : the nasal mucosa was normal [Supple] : supple [No Respiratory Distress] : no respiratory distress  [Clear to Auscultation] : lungs were clear to auscultation bilaterally [Normal Rate] : normal rate  [Regular Rhythm] : with a regular rhythm [Normal Affect] : the affect was normal [Normal Mood] : the mood was normal [de-identified] : +Cerumen impaction right ear, left ear is normal. Status post irrigation With no success,+ adhered wax to the 6:00 position of canal, unable to reach with tool

## 2019-12-16 NOTE — ASSESSMENT
[FreeTextEntry1] : Status post Irrigation without success-ENT referral given. Diabetic wellness referral generated. Patient will return to the office as scheduled\par \par \par \par Dr. De Jesus was present in office building while I examined patient\par

## 2019-12-16 NOTE — HISTORY OF PRESENT ILLNESS
[FreeTextEntry8] : Patient presents complaining of\par 1. Decreased hearing right ear, history of cerumen impaction. Patient states left ear feels fine\par \par 2. Would also like referral to nutritionist/diabetic educator for uncontrolled diabetes/obesity

## 2020-01-03 ENCOUNTER — APPOINTMENT (OUTPATIENT)
Dept: INTERNAL MEDICINE | Facility: CLINIC | Age: 70
End: 2020-01-03
Payer: COMMERCIAL

## 2020-01-03 VITALS — HEART RATE: 79 BPM | SYSTOLIC BLOOD PRESSURE: 130 MMHG | DIASTOLIC BLOOD PRESSURE: 80 MMHG

## 2020-01-03 PROCEDURE — 99213 OFFICE O/P EST LOW 20 MIN: CPT

## 2020-01-03 NOTE — PHYSICAL EXAM
[No Acute Distress] : no acute distress [Normal Outer Ear/Nose] : the outer ears and nose were normal in appearance [Normal TMs] : both tympanic membranes were normal [Normal Oropharynx] : the oropharynx was normal [Normal Nasal Mucosa] : the nasal mucosa was normal [No Lymphadenopathy] : no lymphadenopathy [No Respiratory Distress] : no respiratory distress  [Clear to Auscultation] : lungs were clear to auscultation bilaterally [Normal Rate] : normal rate  [Regular Rhythm] : with a regular rhythm [Normal Affect] : the affect was normal [Normal Mood] : the mood was normal [de-identified] : no pinnal/tragal pain, not tender over TMJ or mastoid. +minimal wax 6:00 position right canal

## 2020-01-03 NOTE — ASSESSMENT
[FreeTextEntry1] : Patient with right ear complaint but no abnormality on exam, symptoms sound related to TMJ but nontender over TMJ joint and the patient reports symptoms are better than they were yesterday so he will followup with ENT on Monday. Patient will return as scheduled for regular followup\par \par Dr. De Jesus was present in office building while I examined patient\par

## 2020-01-03 NOTE — HISTORY OF PRESENT ILLNESS
[FreeTextEntry8] : Pt presents complaining of right ear issue. See prior note as patient was seen on 12/16/19 and was found to have cerumen impaction that was unsuccessfully removed therefore saw ENT the same day. Patient states ENT had significant difficulty as well taking out  the wax and after 40 minutes it was removed and his ear felt better. Patient states issue cleared up but he bit an apple yesterday and then developed discomfort in his right ear/jaw. Patient put in debrox and did irrigation at home which helped slightly. Patient states he does not have any pain other than mild discomfort in the right ear/jaw when he is chewing, no ear discharge, fever. Patient is scheduled to see ENT on Monday

## 2020-01-29 ENCOUNTER — APPOINTMENT (OUTPATIENT)
Dept: ORTHOPEDIC SURGERY | Facility: CLINIC | Age: 70
End: 2020-01-29

## 2020-03-02 ENCOUNTER — RX RENEWAL (OUTPATIENT)
Age: 70
End: 2020-03-02

## 2020-04-01 ENCOUNTER — RX RENEWAL (OUTPATIENT)
Age: 70
End: 2020-04-01

## 2020-04-06 ENCOUNTER — RX RENEWAL (OUTPATIENT)
Age: 70
End: 2020-04-06

## 2020-05-19 ENCOUNTER — APPOINTMENT (OUTPATIENT)
Dept: INTERNAL MEDICINE | Facility: CLINIC | Age: 70
End: 2020-05-19
Payer: COMMERCIAL

## 2020-05-19 VITALS
BODY MASS INDEX: 31.71 KG/M2 | HEIGHT: 67 IN | WEIGHT: 202 LBS | DIASTOLIC BLOOD PRESSURE: 80 MMHG | OXYGEN SATURATION: 97 % | SYSTOLIC BLOOD PRESSURE: 120 MMHG | HEART RATE: 74 BPM

## 2020-05-19 PROCEDURE — 36415 COLL VENOUS BLD VENIPUNCTURE: CPT

## 2020-05-19 PROCEDURE — 99213 OFFICE O/P EST LOW 20 MIN: CPT | Mod: 25

## 2020-05-19 RX ORDER — IBUPROFEN 200 MG/1
TABLET, FILM COATED ORAL
Refills: 0 | Status: DISCONTINUED | COMMUNITY
End: 2020-05-19

## 2020-05-19 NOTE — PHYSICAL EXAM
[General Appearance - In No Acute Distress] : in no acute distress [] : no respiratory distress [Auscultation Breath Sounds / Voice Sounds] : lungs were clear to auscultation bilaterally [Respiration, Rhythm And Depth] : normal respiratory rhythm and effort [Affect] : the affect was normal [Mood] : the mood was normal [Heart Rate And Rhythm] : heart rate was normal and rhythm regular

## 2020-05-20 LAB
ALBUMIN SERPL ELPH-MCNC: 4.7 G/DL
ALP BLD-CCNC: 81 U/L
ALT SERPL-CCNC: 29 U/L
ANION GAP SERPL CALC-SCNC: 13 MMOL/L
APPEARANCE: CLEAR
AST SERPL-CCNC: 20 U/L
BACTERIA UR CULT: NORMAL
BACTERIA: NEGATIVE
BASOPHILS # BLD AUTO: 0.06 K/UL
BASOPHILS NFR BLD AUTO: 1 %
BILIRUB SERPL-MCNC: 0.5 MG/DL
BILIRUBIN URINE: NEGATIVE
BLOOD URINE: NEGATIVE
BUN SERPL-MCNC: 16 MG/DL
CALCIUM SERPL-MCNC: 9.5 MG/DL
CHLORIDE SERPL-SCNC: 101 MMOL/L
CHOLEST SERPL-MCNC: 160 MG/DL
CHOLEST/HDLC SERPL: 2.4 RATIO
CO2 SERPL-SCNC: 25 MMOL/L
COLOR: NORMAL
CREAT SERPL-MCNC: 0.79 MG/DL
EOSINOPHIL # BLD AUTO: 0.08 K/UL
EOSINOPHIL NFR BLD AUTO: 1.3 %
ESTIMATED AVERAGE GLUCOSE: 157 MG/DL
GLUCOSE QUALITATIVE U: NEGATIVE
GLUCOSE SERPL-MCNC: 148 MG/DL
HBA1C MFR BLD HPLC: 7.1 %
HCT VFR BLD CALC: 45.9 %
HDLC SERPL-MCNC: 66 MG/DL
HGB BLD-MCNC: 15.1 G/DL
HYALINE CASTS: 0 /LPF
IMM GRANULOCYTES NFR BLD AUTO: 0.2 %
KETONES URINE: NEGATIVE
LDLC SERPL CALC-MCNC: 75 MG/DL
LEUKOCYTE ESTERASE URINE: NEGATIVE
LYMPHOCYTES # BLD AUTO: 1 K/UL
LYMPHOCYTES NFR BLD AUTO: 16.7 %
MAGNESIUM SERPL-MCNC: 2.1 MG/DL
MAN DIFF?: NORMAL
MCHC RBC-ENTMCNC: 30.4 PG
MCHC RBC-ENTMCNC: 32.9 GM/DL
MCV RBC AUTO: 92.5 FL
MICROSCOPIC-UA: NORMAL
MONOCYTES # BLD AUTO: 0.61 K/UL
MONOCYTES NFR BLD AUTO: 10.2 %
NEUTROPHILS # BLD AUTO: 4.23 K/UL
NEUTROPHILS NFR BLD AUTO: 70.6 %
NITRITE URINE: NEGATIVE
PH URINE: 7.5
PLATELET # BLD AUTO: 267 K/UL
POTASSIUM SERPL-SCNC: 3.9 MMOL/L
PROT SERPL-MCNC: 6.7 G/DL
PROTEIN URINE: NEGATIVE
PSA FREE FLD-MCNC: 18 %
PSA FREE SERPL-MCNC: 1.06 NG/ML
PSA SERPL-MCNC: 5.82 NG/ML
RBC # BLD: 4.96 M/UL
RBC # FLD: 13.3 %
RED BLOOD CELLS URINE: 1 /HPF
SARS-COV-2 IGG SERPL IA-ACNC: 0.1 INDEX
SARS-COV-2 IGG SERPL QL IA: NEGATIVE
SODIUM SERPL-SCNC: 139 MMOL/L
SPECIFIC GRAVITY URINE: 1.02
SQUAMOUS EPITHELIAL CELLS: 0 /HPF
TRIGL SERPL-MCNC: 95 MG/DL
UROBILINOGEN URINE: NORMAL
WBC # FLD AUTO: 5.99 K/UL
WHITE BLOOD CELLS URINE: 0 /HPF

## 2020-05-20 NOTE — HISTORY OF PRESENT ILLNESS
[FreeTextEntry1] : Patient presents for followup on hypertension/hyperlipidemia/type 2 diabetes. Patient is currently fasting for today's labs and offers no acute complaints. Patient is currently on Lotrel/hydrochlorothiazide for hypertension, on Crestor for hyperlipidemia and is controlling his type 2 diabetes dietarily. \par -needs PSA/urine per \par -Agrees to COVID ab testing\par \par

## 2020-05-20 NOTE — ASSESSMENT
[FreeTextEntry1] : Venipuncture done in our office, Labs sent out/further recommendations will be made based on lab results. Patient advised to continue present medications with diet/exercise and specialist follow up.Patient will return to the office in 3-4 months\par \par last colonoscopy was 7/2018, follow up in 3 years \par 24 urine was 12/2019\par MA 11/2019\par specialists include\par 1. ophthalmology-Dr. Garcia 11/2019\par 2.urology-Dr. Handley\par 3.sees orthopedic on a p.r.n. basis-Dr. Gonzales\par 4.Dr. Lazcano at [place] (hand)-NO FOLLOW UP NEEDED\par 5.podiatry-Dr. Flores\par 6.ENT-Dr. Rod\par 7. cardiology (stress test 12/2018)-Dr. Calloway\par 8.Ortho-Dr. Mukherjee\par shingles vaccine discussed\par Reports hepatitis C screening in the past as negative

## 2020-08-07 NOTE — PATIENT PROFILE ADULT - NSPROHMSYMPCOND_GEN_A_NUR
Mild hypervolemic hypernatremia with improving Na level 148 likely due to poor oral intake and CHF with volume overload  - Advised po fluid intake  - Chest X-ray with clinical volume overload and pleural effusion  - Continue IV diuresis   - Monitor BMP daily Mild hypervolemic hypernatremia with improving Na level 148 likely due to poor oral intake and super imposed CHF with volume overload  - Advised po fluid intake if tolerated   - Chest X-ray with clinical volume overload and pleural effusion  - Continue IV diuresis with monitoring BMP none

## 2020-08-29 ENCOUNTER — RX RENEWAL (OUTPATIENT)
Age: 70
End: 2020-08-29

## 2020-09-09 NOTE — RETURN TO WORK/SCHOOL
calm [Return Date: _____] : as of [unfilled].  This has been discussed in detail with ~Leigh Ann~ and ~he/she~ understands this. [Full Duty] : full duty

## 2020-12-11 ENCOUNTER — NON-APPOINTMENT (OUTPATIENT)
Age: 70
End: 2020-12-11

## 2020-12-11 ENCOUNTER — APPOINTMENT (OUTPATIENT)
Dept: CARDIOLOGY | Facility: CLINIC | Age: 70
End: 2020-12-11
Payer: COMMERCIAL

## 2020-12-11 VITALS
HEART RATE: 69 BPM | DIASTOLIC BLOOD PRESSURE: 72 MMHG | BODY MASS INDEX: 33.27 KG/M2 | HEIGHT: 67 IN | OXYGEN SATURATION: 97 % | SYSTOLIC BLOOD PRESSURE: 130 MMHG | WEIGHT: 212 LBS | TEMPERATURE: 97.9 F

## 2020-12-11 DIAGNOSIS — Z82.41 FAMILY HISTORY OF SUDDEN CARDIAC DEATH: ICD-10-CM

## 2020-12-11 PROCEDURE — 99072 ADDL SUPL MATRL&STAF TM PHE: CPT

## 2020-12-11 PROCEDURE — 99214 OFFICE O/P EST MOD 30 MIN: CPT

## 2020-12-11 PROCEDURE — 93000 ELECTROCARDIOGRAM COMPLETE: CPT

## 2020-12-11 RX ORDER — SILODOSIN 8 MG/1
8 CAPSULE ORAL DAILY
Qty: 30 | Refills: 2 | Status: ACTIVE | COMMUNITY
Start: 2020-12-11

## 2020-12-15 ENCOUNTER — APPOINTMENT (OUTPATIENT)
Dept: INTERNAL MEDICINE | Facility: CLINIC | Age: 70
End: 2020-12-15
Payer: COMMERCIAL

## 2020-12-15 VITALS
WEIGHT: 204 LBS | RESPIRATION RATE: 14 BRPM | BODY MASS INDEX: 32.02 KG/M2 | DIASTOLIC BLOOD PRESSURE: 80 MMHG | TEMPERATURE: 97.2 F | HEART RATE: 82 BPM | HEIGHT: 67 IN | SYSTOLIC BLOOD PRESSURE: 120 MMHG

## 2020-12-15 PROCEDURE — G0442 ANNUAL ALCOHOL SCREEN 15 MIN: CPT | Mod: NC

## 2020-12-15 PROCEDURE — 99397 PER PM REEVAL EST PAT 65+ YR: CPT | Mod: 25

## 2020-12-15 PROCEDURE — G0444 DEPRESSION SCREEN ANNUAL: CPT | Mod: NC,59

## 2020-12-15 PROCEDURE — 36415 COLL VENOUS BLD VENIPUNCTURE: CPT

## 2020-12-15 PROCEDURE — 99072 ADDL SUPL MATRL&STAF TM PHE: CPT

## 2020-12-15 RX ORDER — AMOXICILLIN 500 MG/1
500 CAPSULE ORAL
Qty: 20 | Refills: 0 | Status: DISCONTINUED | COMMUNITY
Start: 2020-12-12

## 2020-12-15 NOTE — COUNSELING
[Weight management counseling provided] : Weight management [Healthy eating counseling provided] : healthy eating [Activity counseling provided] : activity [Low Fat Diet] : Low fat diet [Decrease Portions] : Decrease food portions [Walking] : Walking [None] : None [Potential consequences of obesity discussed] : Potential consequences of obesity discussed [Encouraged to increase physical activity] : Encouraged to increase physical activity [Good understanding] : Patient has a good understanding of lifestyle changes and the steps needed to achieve self management goals

## 2020-12-16 LAB
ALBUMIN SERPL ELPH-MCNC: 4.8 G/DL
ALP BLD-CCNC: 93 U/L
ALT SERPL-CCNC: 23 U/L
ANION GAP SERPL CALC-SCNC: 13 MMOL/L
APPEARANCE: CLEAR
AST SERPL-CCNC: 23 U/L
BACTERIA UR CULT: NORMAL
BACTERIA: NEGATIVE
BASOPHILS # BLD AUTO: 0.08 K/UL
BASOPHILS NFR BLD AUTO: 1.2 %
BILIRUB SERPL-MCNC: 0.5 MG/DL
BILIRUBIN URINE: NEGATIVE
BLOOD URINE: NEGATIVE
BUN SERPL-MCNC: 13 MG/DL
CALCIUM SERPL-MCNC: 10.2 MG/DL
CHLORIDE SERPL-SCNC: 101 MMOL/L
CHOLEST SERPL-MCNC: 173 MG/DL
CO2 SERPL-SCNC: 24 MMOL/L
COLOR: NORMAL
CREAT SERPL-MCNC: 0.83 MG/DL
CREAT SPEC-SCNC: 88 MG/DL
EOSINOPHIL # BLD AUTO: 0.14 K/UL
EOSINOPHIL NFR BLD AUTO: 2 %
ESTIMATED AVERAGE GLUCOSE: 166 MG/DL
GLUCOSE QUALITATIVE U: NEGATIVE
GLUCOSE SERPL-MCNC: 167 MG/DL
HBA1C MFR BLD HPLC: 7.4 %
HCT VFR BLD CALC: 47.1 %
HDLC SERPL-MCNC: 59 MG/DL
HGB BLD-MCNC: 15.6 G/DL
HYALINE CASTS: 0 /LPF
IMM GRANULOCYTES NFR BLD AUTO: 0.3 %
KETONES URINE: NEGATIVE
LDLC SERPL CALC-MCNC: 85 MG/DL
LEUKOCYTE ESTERASE URINE: NEGATIVE
LYMPHOCYTES # BLD AUTO: 1.05 K/UL
LYMPHOCYTES NFR BLD AUTO: 15.1 %
MAGNESIUM SERPL-MCNC: 2.1 MG/DL
MAN DIFF?: NORMAL
MCHC RBC-ENTMCNC: 30.1 PG
MCHC RBC-ENTMCNC: 33.1 GM/DL
MCV RBC AUTO: 90.9 FL
MICROALBUMIN 24H UR DL<=1MG/L-MCNC: 1.8 MG/DL
MICROALBUMIN/CREAT 24H UR-RTO: 21 MG/G
MICROSCOPIC-UA: NORMAL
MONOCYTES # BLD AUTO: 0.65 K/UL
MONOCYTES NFR BLD AUTO: 9.4 %
NEUTROPHILS # BLD AUTO: 5 K/UL
NEUTROPHILS NFR BLD AUTO: 72 %
NITRITE URINE: NEGATIVE
NONHDLC SERPL-MCNC: 114 MG/DL
PH URINE: 6
PLATELET # BLD AUTO: 292 K/UL
POTASSIUM SERPL-SCNC: 4.2 MMOL/L
PROT SERPL-MCNC: 7.3 G/DL
PROTEIN URINE: NEGATIVE
PSA FREE FLD-MCNC: 20 %
PSA FREE SERPL-MCNC: 1.55 NG/ML
PSA SERPL-MCNC: 7.9 NG/ML
RBC # BLD: 5.18 M/UL
RBC # FLD: 12.9 %
RED BLOOD CELLS URINE: 0 /HPF
SODIUM SERPL-SCNC: 138 MMOL/L
SPECIFIC GRAVITY URINE: 1.02
SQUAMOUS EPITHELIAL CELLS: 0 /HPF
TRIGL SERPL-MCNC: 147 MG/DL
UROBILINOGEN URINE: NORMAL
WBC # FLD AUTO: 6.94 K/UL
WHITE BLOOD CELLS URINE: 0 /HPF

## 2020-12-16 NOTE — ADDENDUM
[FreeTextEntry1] : Blood work good other than hemoglobin A1c increased to 7.4 with patient continuing to decline treatment and states will improve his diet and exercise.\par \par PSA increased from 5.8 to 7.9 with free PSA at 20%.\par Patient to followup urology.

## 2020-12-16 NOTE — HEALTH RISK ASSESSMENT
[Yes] : Yes [Monthly or less (1 pt)] : Monthly or less (1 point) [1 or 2 (0 pts)] : 1 or 2 (0 points) [Never (0 pts)] : Never (0 points) [No] : In the past 12 months have you used drugs other than those required for medical reasons? No [No falls in past year] : Patient reported no falls in the past year [0] : 2) Feeling down, depressed, or hopeless: Not at all (0) [None] : None [Alone] : lives alone [Employed] : employed [Single] : single [# Of Children ___] : has [unfilled] children [Feels Safe at Home] : Feels safe at home [Fully functional (bathing, dressing, toileting, transferring, walking, feeding)] : Fully functional (bathing, dressing, toileting, transferring, walking, feeding) [Fully functional (using the telephone, shopping, preparing meals, housekeeping, doing laundry, using] : Fully functional and needs no help or supervision to perform IADLs (using the telephone, shopping, preparing meals, housekeeping, doing laundry, using transportation, managing medications and managing finances) [Smoke Detector] : smoke detector [Carbon Monoxide Detector] : carbon monoxide detector [Seat Belt] :  uses seat belt [Sunscreen] : uses sunscreen [Reviewed no changes] : Reviewed no changes [Discussed at today's visit] : Advance Directives Discussed at today's visit [Designated Healthcare Proxy] : Designated healthcare proxy [Good] : ~his/her~ current health as good [Graduate School] : graduate school [Name: ___] : Health Care Proxy's Name: [unfilled]  [No Retinopathy] : No retinopathy [] : No [Audit-CScore] : 1 [de-identified] : ACTIVE [de-identified] : fair [[id number]] : 0 [EyeExamDate] : 10/20 [Change in mental status noted] : No change in mental status noted [Reports changes in hearing] : Reports no changes in hearing [Reports changes in vision] : Reports no changes in vision [Reports changes in dental health] : Reports no changes in dental health [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

## 2020-12-16 NOTE — HISTORY OF PRESENT ILLNESS
[FreeTextEntry1] : 70-year-old male with history of diet-controlled type 2 diabetes, hypertension, hyperlipidemia and obesity presents for his yearly physical.\par \par The patient diabetes had not been adequately controlled with medication recommended which the patient declines. He states he has increased his exercise with some weight loss.\par Last hemoglobin A1c decreased from 7.3 to 7.1\par \par Patient's history also includes BPH with elevated PSA and has had biopsies in the past and follows with urologist . 2019 with urology showed PSA increased. 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 no recent issues\par \par Otherwise generally is feeling fine without complaints including no chest pain, palpitations, shortness of breath or edema.\par \par The patient had progressive arthritis of his knees especially his left and had a left total knee replacement December 2018 with great success. Some issues with his right knee.\par \par The patient has not made any lifestyle changes with diet nor exercising for no weight loss.

## 2020-12-16 NOTE — ASSESSMENT
[FreeTextEntry1] : 70-year-old male with history of hypertension which is controlled with present medications, hyperlipidemia which is also controlled and type 2 diabetes with most recent hemoglobin A1c 7.1 down from 7.3.\par patient continues to desire to avoid medications..\par  \par Currently medically stable without any active medical issues.\par \par Patient continues to need lifestyle changes with diet exercise and weight loss.\par He is to followup with urology as scheduled\par He is to continue present medications\par \par Colonoscopy August 2018 with followup in 3 years secondary to history of polyps\par Ophthalmology yearly. october 2020 with no diabetic retinopathy.\par \par All of the vaccines up-to-date\par influenza vaccine received at work\par \par Venipuncture done today in the office\par \par Followup in 3 months

## 2021-01-03 NOTE — HISTORY OF PRESENT ILLNESS
[FreeTextEntry1] : 70 year male with PMH of diet controlled diabetes, HTN, and HLD presents for routine follow up. he is s/p left knee replacement 12/2018. He states he is feeling well. He denies any symptoms of chest pain, dyspnea, orthopnea, palpitations, fatigue, edema, near syncope or syncope. He reports he is compliant with his medications. His most recent HgbA1C was 7.1 and proteinuria on 24 hr urine. He does not want to be on medication for DM and has started strict diet and has increased his exercise.  He is lifting weights routinely with no symptoms. He walks 2 miles daily.  He had an echocardiogram in 2015 with normal LV function and no significant valvular disease. Nuclear stress test in 12/2018 with no evidence of infarction or inducible ischemia.  Lipids in May with LDL 75, HDL 66, t chol 160 which are the best results he has had.  HgbA1C and lipids to be repeated next week.

## 2021-01-07 ENCOUNTER — RX RENEWAL (OUTPATIENT)
Age: 71
End: 2021-01-07

## 2021-02-02 ENCOUNTER — RX RENEWAL (OUTPATIENT)
Age: 71
End: 2021-02-02

## 2021-02-16 NOTE — OCCUPATIONAL THERAPY INITIAL EVALUATION ADULT - LEVEL OF INDEPENDENCE: GROOMING, OT EVAL
"Pt up at nurses station requesting something for the images in her head and feeling as if she was about to go off. RN offered Zyprexa and pt requested something different. Visatril offered and pt states,\"I don't want no fuckin benadryl. I want dilaudid or Geodon\" Pt informed of prn medications and agreed to Zyprexa. Zyprexa given at this time.   " independent

## 2021-05-21 ENCOUNTER — APPOINTMENT (OUTPATIENT)
Dept: INTERNAL MEDICINE | Facility: CLINIC | Age: 71
End: 2021-05-21
Payer: COMMERCIAL

## 2021-05-21 VITALS
SYSTOLIC BLOOD PRESSURE: 130 MMHG | HEART RATE: 76 BPM | TEMPERATURE: 97.3 F | BODY MASS INDEX: 32.26 KG/M2 | RESPIRATION RATE: 13 BRPM | WEIGHT: 206 LBS | DIASTOLIC BLOOD PRESSURE: 74 MMHG

## 2021-05-21 PROCEDURE — 99072 ADDL SUPL MATRL&STAF TM PHE: CPT

## 2021-05-21 PROCEDURE — 99214 OFFICE O/P EST MOD 30 MIN: CPT | Mod: 25

## 2021-05-21 PROCEDURE — 36415 COLL VENOUS BLD VENIPUNCTURE: CPT

## 2021-05-22 LAB
ALBUMIN SERPL ELPH-MCNC: 4.7 G/DL
ALP BLD-CCNC: 91 U/L
ALT SERPL-CCNC: 24 U/L
ANION GAP SERPL CALC-SCNC: 15 MMOL/L
APPEARANCE: CLEAR
AST SERPL-CCNC: 21 U/L
BACTERIA: NEGATIVE
BASOPHILS # BLD AUTO: 0.08 K/UL
BASOPHILS NFR BLD AUTO: 1.4 %
BILIRUB SERPL-MCNC: 0.4 MG/DL
BILIRUBIN URINE: NEGATIVE
BLOOD URINE: NEGATIVE
BUN SERPL-MCNC: 17 MG/DL
CALCIUM SERPL-MCNC: 9.8 MG/DL
CHLORIDE SERPL-SCNC: 102 MMOL/L
CHOLEST SERPL-MCNC: 161 MG/DL
CO2 SERPL-SCNC: 21 MMOL/L
COLOR: YELLOW
CREAT SERPL-MCNC: 0.79 MG/DL
EOSINOPHIL # BLD AUTO: 0.22 K/UL
EOSINOPHIL NFR BLD AUTO: 3.7 %
ESTIMATED AVERAGE GLUCOSE: 174 MG/DL
GLUCOSE QUALITATIVE U: NEGATIVE
GLUCOSE SERPL-MCNC: 170 MG/DL
HBA1C MFR BLD HPLC: 7.7 %
HCT VFR BLD CALC: 47.5 %
HDLC SERPL-MCNC: 53 MG/DL
HGB BLD-MCNC: 15.3 G/DL
HYALINE CASTS: 0 /LPF
IMM GRANULOCYTES NFR BLD AUTO: 0.2 %
KETONES URINE: NEGATIVE
LDLC SERPL CALC-MCNC: 85 MG/DL
LEUKOCYTE ESTERASE URINE: NEGATIVE
LYMPHOCYTES # BLD AUTO: 1.07 K/UL
LYMPHOCYTES NFR BLD AUTO: 18.1 %
MAGNESIUM SERPL-MCNC: 2.1 MG/DL
MAN DIFF?: NORMAL
MCHC RBC-ENTMCNC: 30 PG
MCHC RBC-ENTMCNC: 32.2 GM/DL
MCV RBC AUTO: 93.1 FL
MICROSCOPIC-UA: NORMAL
MONOCYTES # BLD AUTO: 0.62 K/UL
MONOCYTES NFR BLD AUTO: 10.5 %
NEUTROPHILS # BLD AUTO: 3.9 K/UL
NEUTROPHILS NFR BLD AUTO: 66.1 %
NITRITE URINE: NEGATIVE
NONHDLC SERPL-MCNC: 108 MG/DL
PH URINE: 6.5
PLATELET # BLD AUTO: 280 K/UL
POTASSIUM SERPL-SCNC: 3.9 MMOL/L
PROT SERPL-MCNC: 7.1 G/DL
PROTEIN URINE: NEGATIVE
PSA SERPL-MCNC: 6.91 NG/ML
RBC # BLD: 5.1 M/UL
RBC # FLD: 13.3 %
RED BLOOD CELLS URINE: 1 /HPF
SODIUM SERPL-SCNC: 138 MMOL/L
SPECIFIC GRAVITY URINE: 1.02
SQUAMOUS EPITHELIAL CELLS: 0 /HPF
TRIGL SERPL-MCNC: 118 MG/DL
UROBILINOGEN URINE: NORMAL
WBC # FLD AUTO: 5.9 K/UL
WHITE BLOOD CELLS URINE: 0 /HPF

## 2021-05-24 ENCOUNTER — NON-APPOINTMENT (OUTPATIENT)
Age: 71
End: 2021-05-24

## 2021-05-24 LAB — BACTERIA UR CULT: NORMAL

## 2021-05-24 NOTE — ASSESSMENT
[FreeTextEntry1] : Venipuncture done in our office, Labs sent out/further recommendations will be made based on lab results. Patient advised to continue present medications with diet/exercise and specialist follow up.Patient will return to the office in 3-4 months\par \par last colonoscopy was 7/2018, follow up in 3 years \par 24 urine was 12/2019=container given to repeat\par MA 12/2020\par specialists include\par 1. ophthalmology-Dr. Garcia=to call for report\par 2.urology-Dr. Handley\par 3.sees orthopedic on a p.r.n. basis-Dr. Gonzales\par 4.Dr. Lazcano at [place] (hand)-NO FOLLOW UP NEEDED\par 5.podiatry-Dr. Flores\par 6.ENT-Dr. Rod\par 7. cardiology (stress test 12/2018)-Dr. Calloway\par 8.Ortho-Dr. Mukherjee\par shingles vaccine discussed, +got covid vaccines\par Reports hepatitis C screening in the past as negative

## 2021-05-24 NOTE — HISTORY OF PRESENT ILLNESS
[FreeTextEntry1] : Patient presents for followup on hypertension/hyperlipidemia/type 2 diabetes. Patient is currently fasting for today's labs and offers no acute complaints. Patient is currently on Lotrel/hydrochlorothiazide for hypertension, on Crestor for hyperlipidemia and is controlling his type 2 diabetes dietarily. \par -needs PSA/urine per \par -got covid vaccines\par \par \par

## 2021-07-07 ENCOUNTER — RX RENEWAL (OUTPATIENT)
Age: 71
End: 2021-07-07

## 2021-08-11 ENCOUNTER — NON-APPOINTMENT (OUTPATIENT)
Age: 71
End: 2021-08-11

## 2021-08-16 ENCOUNTER — RX RENEWAL (OUTPATIENT)
Age: 71
End: 2021-08-16

## 2021-08-23 ENCOUNTER — RX RENEWAL (OUTPATIENT)
Age: 71
End: 2021-08-23

## 2021-09-17 ENCOUNTER — APPOINTMENT (OUTPATIENT)
Dept: INTERNAL MEDICINE | Facility: CLINIC | Age: 71
End: 2021-09-17
Payer: COMMERCIAL

## 2021-09-17 VITALS
SYSTOLIC BLOOD PRESSURE: 124 MMHG | DIASTOLIC BLOOD PRESSURE: 75 MMHG | OXYGEN SATURATION: 98 % | WEIGHT: 183 LBS | RESPIRATION RATE: 12 BRPM | BODY MASS INDEX: 28.72 KG/M2 | HEART RATE: 69 BPM | TEMPERATURE: 97 F | HEIGHT: 67 IN

## 2021-09-17 PROCEDURE — 99214 OFFICE O/P EST MOD 30 MIN: CPT | Mod: 25

## 2021-09-17 PROCEDURE — 36415 COLL VENOUS BLD VENIPUNCTURE: CPT

## 2021-09-17 NOTE — HISTORY OF PRESENT ILLNESS
[FreeTextEntry1] : Patient presents for followup on hypertension/hyperlipidemia/type 2 diabetes. Patient is currently fasting for today's labs. Patient is currently on Lotrel/hydrochlorothiazide for hypertension, on Crestor for hyperlipidemia and is controlling his type 2 diabetes dietarily. \par -got covid vaccines\par -pt had covid in aug, recovered well\par -pt has been doing intermittent fasting/diet/ex with +weight loss\par -req mobic prn, "will use sparingly", aware of renal and GI SE\par \par \par

## 2021-09-18 LAB
ALBUMIN SERPL ELPH-MCNC: 4.9 G/DL
ALP BLD-CCNC: 87 U/L
ALT SERPL-CCNC: 18 U/L
ANION GAP SERPL CALC-SCNC: 14 MMOL/L
AST SERPL-CCNC: 18 U/L
BASOPHILS # BLD AUTO: 0.07 K/UL
BASOPHILS NFR BLD AUTO: 1.2 %
BILIRUB SERPL-MCNC: 0.5 MG/DL
BUN SERPL-MCNC: 16 MG/DL
CALCIUM SERPL-MCNC: 9.8 MG/DL
CHLORIDE SERPL-SCNC: 102 MMOL/L
CHOLEST SERPL-MCNC: 160 MG/DL
CO2 SERPL-SCNC: 25 MMOL/L
CREAT 24H UR-MCNC: 1.6 G/24 H
CREAT ?TM UR-MCNC: 73 MG/DL
CREAT SERPL-MCNC: 0.78 MG/DL
EOSINOPHIL # BLD AUTO: 0.25 K/UL
EOSINOPHIL NFR BLD AUTO: 4.4 %
ESTIMATED AVERAGE GLUCOSE: 128 MG/DL
GLUCOSE SERPL-MCNC: 131 MG/DL
HBA1C MFR BLD HPLC: 6.1 %
HCT VFR BLD CALC: 46.9 %
HDLC SERPL-MCNC: 60 MG/DL
HGB BLD-MCNC: 15.6 G/DL
IMM GRANULOCYTES NFR BLD AUTO: 0.2 %
LDLC SERPL CALC-MCNC: 86 MG/DL
LYMPHOCYTES # BLD AUTO: 1.1 K/UL
LYMPHOCYTES NFR BLD AUTO: 19.3 %
MAGNESIUM SERPL-MCNC: 2.1 MG/DL
MAN DIFF?: NORMAL
MCHC RBC-ENTMCNC: 30.5 PG
MCHC RBC-ENTMCNC: 33.3 GM/DL
MCV RBC AUTO: 91.8 FL
MONOCYTES # BLD AUTO: 0.64 K/UL
MONOCYTES NFR BLD AUTO: 11.2 %
NEUTROPHILS # BLD AUTO: 3.62 K/UL
NEUTROPHILS NFR BLD AUTO: 63.7 %
NONHDLC SERPL-MCNC: 100 MG/DL
PLATELET # BLD AUTO: 261 K/UL
POTASSIUM SERPL-SCNC: 4 MMOL/L
PROT 24H UR-MRATE: 6 MG/DL
PROT ?TM UR-MCNC: 24 HR
PROT SERPL-MCNC: 7.3 G/DL
PROT UR-MCNC: 135 MG/24 H
RBC # BLD: 5.11 M/UL
RBC # FLD: 13.2 %
SODIUM SERPL-SCNC: 142 MMOL/L
SPECIMEN VOL 24H UR: 2250 ML
TRIGL SERPL-MCNC: 70 MG/DL
WBC # FLD AUTO: 5.69 K/UL

## 2021-09-20 ENCOUNTER — NON-APPOINTMENT (OUTPATIENT)
Age: 71
End: 2021-09-20

## 2021-09-20 ENCOUNTER — TRANSCRIPTION ENCOUNTER (OUTPATIENT)
Age: 71
End: 2021-09-20

## 2021-10-26 ENCOUNTER — NON-APPOINTMENT (OUTPATIENT)
Age: 71
End: 2021-10-26

## 2021-11-18 ENCOUNTER — RX RENEWAL (OUTPATIENT)
Age: 71
End: 2021-11-18

## 2021-12-28 NOTE — COUNSELING
[Potential consequences of obesity discussed] : Potential consequences of obesity discussed [Encouraged to increase physical activity] : Encouraged to increase physical activity [Weight management counseling provided] : Weight management [Healthy eating counseling provided] : healthy eating [Activity counseling provided] : activity [Low Fat Diet] : Low fat diet [Decrease Portions] : Decrease food portions [Walking] : Walking [None] : None [Good understanding] : Patient has a good understanding of lifestyle changes and the steps needed to achieve self management goals

## 2021-12-29 ENCOUNTER — APPOINTMENT (OUTPATIENT)
Dept: INTERNAL MEDICINE | Facility: CLINIC | Age: 71
End: 2021-12-29
Payer: COMMERCIAL

## 2021-12-29 VITALS
SYSTOLIC BLOOD PRESSURE: 125 MMHG | HEART RATE: 72 BPM | DIASTOLIC BLOOD PRESSURE: 80 MMHG | BODY MASS INDEX: 29.82 KG/M2 | WEIGHT: 190 LBS | TEMPERATURE: 97.6 F | RESPIRATION RATE: 13 BRPM | HEIGHT: 67 IN

## 2021-12-29 PROCEDURE — 36415 COLL VENOUS BLD VENIPUNCTURE: CPT

## 2021-12-29 PROCEDURE — G0442 ANNUAL ALCOHOL SCREEN 15 MIN: CPT | Mod: NC

## 2021-12-29 PROCEDURE — G0444 DEPRESSION SCREEN ANNUAL: CPT | Mod: NC,59

## 2021-12-29 PROCEDURE — 99397 PER PM REEVAL EST PAT 65+ YR: CPT | Mod: 25

## 2021-12-29 NOTE — PHYSICAL EXAM
[General Appearance - Alert] : alert [General Appearance - In No Acute Distress] : in no acute distress [Sclera] : the sclera and conjunctiva were normal [PERRL With Normal Accommodation] : pupils were equal in size, round, and reactive to light [Extraocular Movements] : extraocular movements were intact [Outer Ear] : the ears and nose were normal in appearance [Oropharynx] : the oropharynx was normal [Neck Appearance] : the appearance of the neck was normal [Neck Cervical Mass (___cm)] : no neck mass was observed [Jugular Venous Distention Increased] : there was no jugular-venous distention [Thyroid Diffuse Enlargement] : the thyroid was not enlarged [Thyroid Nodule] : there were no palpable thyroid nodules [Auscultation Breath Sounds / Voice Sounds] : lungs were clear to auscultation bilaterally [Heart Rate And Rhythm] : heart rate was normal and rhythm regular [Heart Sounds] : normal S1 and S2 [Heart Sounds Gallop] : no gallops [Heart Sounds Pericardial Friction Rub] : no pericardial rub [Arterial Pulses Carotid] : carotid pulses were normal with no bruits [Abdominal Aorta] : the abdominal aorta was normal [Arterial Pulses Femoral] : femoral pulses were normal without bruits [Full Pulse] : the pedal pulses are present [Edema] : there was no peripheral edema [Veins - Varicosity Changes] : there were no varicosital changes [Bowel Sounds] : normal bowel sounds [Abdomen Soft] : soft [Abdomen Tenderness] : non-tender [Abdomen Mass (___ Cm)] : no abdominal mass palpated [Cervical Lymph Nodes Enlarged Posterior Bilaterally] : posterior cervical [Cervical Lymph Nodes Enlarged Anterior Bilaterally] : anterior cervical [Supraclavicular Lymph Nodes Enlarged Bilaterally] : supraclavicular [Axillary Lymph Nodes Enlarged Bilaterally] : axillary [Femoral Lymph Nodes Enlarged Bilaterally] : femoral [Inguinal Lymph Nodes Enlarged Bilaterally] : inguinal [No Spinal Tenderness] : no spinal tenderness [Abnormal Walk] : normal gait [Nail Clubbing] : no clubbing  or cyanosis of the fingernails [Musculoskeletal - Swelling] : no joint swelling seen [Motor Tone] : muscle strength and tone were normal [Skin Color & Pigmentation] : normal skin color and pigmentation [Skin Turgor] : normal skin turgor [] : no rash [Right Foot Was Examined] : right foot was examined [Left Foot Was Examined] : left foot was examined [Normal Appearance] : normal in appearance [Normal in Appearance] : normal in appearance [Normal] : normal [2+] : 2+ in the dorsalis pedis [Cranial Nerves] : cranial nerves 2-12 were intact [No Focal Deficits] : no focal deficits [Oriented To Time, Place, And Person] : oriented to person, place, and time [Impaired Insight] : insight and judgment were intact [Affect] : the affect was normal [FreeTextEntry1] : By urology [#1 Diminished] : number 1 was normal [#2 Diminished] : number 2 was normal [#3 Diminished] : number 3 was normal [#4 Diminished] : number 4 was normal [#5 Diminished] : number 5 was normal [#6 Diminished] : number 6 was normal [#7 Diminished] : number 7 was normal [#8 Diminished] : number 8 was normal [#9 Diminished] : number 9 was normal [#10 Diminished] : number 10 was normal [Over the Past 2 Weeks, Have You Felt Down, Depressed, or Hopeless?] : 1.) Over the past 2 weeks, have you felt down, depressed, or hopeless? No [Over the Past 2 Weeks, Have You Felt Little Interest or Pleasure Doing Things?] : 2.) Over the past 2 weeks, have you felt little interest or pleasure doing things? No

## 2021-12-29 NOTE — HISTORY OF PRESENT ILLNESS
[FreeTextEntry1] : 71-year-old male with history of diet-controlled type 2 diabetes, hypertension, hyperlipidemia and obesity presents for his yearly physical.\par \par The patient diabetes was not adequately controlled with medication recommended which the patient declined.He has made some good lifestyle changes with diet mainly doing intermittent fasting and some exercise with about 20 pounds of weight loss.\par Last hemoglobin A1c much better at 6.1\par \par Patient's history also includes BPH with elevated PSA and has had biopsies in the past and follows with urologist . 2019 with urology showed PSA increased. 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 no recent issues\par \par Otherwise generally is feeling fine without complaints including no chest pain, palpitations, shortness of breath or edema.\par \par The patient had progressive arthritis of his knees especially his left and had a left total knee replacement December 2018 with great success. Some issues with his right knee.\par \par

## 2021-12-29 NOTE — HEALTH RISK ASSESSMENT
[Good] : ~his/her~  mood as  good [Yes] : Yes [Monthly or less (1 pt)] : Monthly or less (1 point) [1 or 2 (0 pts)] : 1 or 2 (0 points) [Never (0 pts)] : Never (0 points) [No] : In the past 12 months have you used drugs other than those required for medical reasons? No [No falls in past year] : Patient reported no falls in the past year [0] : 2) Feeling down, depressed, or hopeless: Not at all (0) [No Retinopathy] : No retinopathy [None] : None [Alone] : lives alone [Employed] : employed [Graduate School] : graduate school [Single] : single [# Of Children ___] : has [unfilled] children [Feels Safe at Home] : Feels safe at home [Fully functional (bathing, dressing, toileting, transferring, walking, feeding)] : Fully functional (bathing, dressing, toileting, transferring, walking, feeding) [Fully functional (using the telephone, shopping, preparing meals, housekeeping, doing laundry, using] : Fully functional and needs no help or supervision to perform IADLs (using the telephone, shopping, preparing meals, housekeeping, doing laundry, using transportation, managing medications and managing finances) [Smoke Detector] : smoke detector [Carbon Monoxide Detector] : carbon monoxide detector [Seat Belt] :  uses seat belt [Sunscreen] : uses sunscreen [Discussed at today's visit] : Advance Directives Discussed at today's visit [Designated Healthcare Proxy] : Designated healthcare proxy [Never] : Never [PHQ-2 Negative - No further assessment needed] : PHQ-2 Negative - No further assessment needed [Reviewed no changes] : Reviewed, no changes [Audit-CScore] : 1 [de-identified] : ACTIVE [de-identified] : improved [[id number]] : 0 [EyeExamDate] : 10/20 [Change in mental status noted] : No change in mental status noted [Reports changes in hearing] : Reports no changes in hearing [Reports changes in vision] : Reports no changes in vision [Reports changes in dental health] : Reports no changes in dental health [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

## 2021-12-30 LAB
ALBUMIN SERPL ELPH-MCNC: 5.1 G/DL
ALP BLD-CCNC: 83 U/L
ALT SERPL-CCNC: 22 U/L
ANION GAP SERPL CALC-SCNC: 15 MMOL/L
APPEARANCE: CLEAR
AST SERPL-CCNC: 21 U/L
BACTERIA: NEGATIVE
BASOPHILS # BLD AUTO: 0.11 K/UL
BASOPHILS NFR BLD AUTO: 2 %
BILIRUB SERPL-MCNC: 0.4 MG/DL
BILIRUBIN URINE: NEGATIVE
BLOOD URINE: NEGATIVE
BUN SERPL-MCNC: 18 MG/DL
CALCIUM SERPL-MCNC: 10 MG/DL
CHLORIDE SERPL-SCNC: 103 MMOL/L
CHOLEST SERPL-MCNC: 169 MG/DL
CO2 SERPL-SCNC: 24 MMOL/L
COLOR: YELLOW
CREAT SERPL-MCNC: 0.88 MG/DL
CREAT SPEC-SCNC: 78 MG/DL
EOSINOPHIL # BLD AUTO: 0.19 K/UL
EOSINOPHIL NFR BLD AUTO: 3.4 %
ESTIMATED AVERAGE GLUCOSE: 134 MG/DL
GLUCOSE QUALITATIVE U: NEGATIVE
GLUCOSE SERPL-MCNC: 132 MG/DL
HBA1C MFR BLD HPLC: 6.3 %
HCT VFR BLD CALC: 47.4 %
HDLC SERPL-MCNC: 67 MG/DL
HGB BLD-MCNC: 15.7 G/DL
HYALINE CASTS: 2 /LPF
IMM GRANULOCYTES NFR BLD AUTO: 0.2 %
KETONES URINE: NEGATIVE
LDLC SERPL CALC-MCNC: 86 MG/DL
LEUKOCYTE ESTERASE URINE: NEGATIVE
LYMPHOCYTES # BLD AUTO: 1.17 K/UL
LYMPHOCYTES NFR BLD AUTO: 20.9 %
MAGNESIUM SERPL-MCNC: 2.1 MG/DL
MAN DIFF?: NORMAL
MCHC RBC-ENTMCNC: 30.5 PG
MCHC RBC-ENTMCNC: 33.1 GM/DL
MCV RBC AUTO: 92.2 FL
MICROALBUMIN 24H UR DL<=1MG/L-MCNC: <1.2 MG/DL
MICROALBUMIN/CREAT 24H UR-RTO: NORMAL MG/G
MICROSCOPIC-UA: NORMAL
MONOCYTES # BLD AUTO: 0.69 K/UL
MONOCYTES NFR BLD AUTO: 12.3 %
NEUTROPHILS # BLD AUTO: 3.43 K/UL
NEUTROPHILS NFR BLD AUTO: 61.2 %
NITRITE URINE: NEGATIVE
NONHDLC SERPL-MCNC: 103 MG/DL
PH URINE: 5.5
PLATELET # BLD AUTO: 259 K/UL
POTASSIUM SERPL-SCNC: 4.7 MMOL/L
PROT SERPL-MCNC: 7.5 G/DL
PROTEIN URINE: NEGATIVE
PSA SERPL-MCNC: 7.94 NG/ML
RBC # BLD: 5.14 M/UL
RBC # FLD: 13.8 %
RED BLOOD CELLS URINE: 1 /HPF
SODIUM SERPL-SCNC: 143 MMOL/L
SPECIFIC GRAVITY URINE: 1.02
SQUAMOUS EPITHELIAL CELLS: 0 /HPF
TRIGL SERPL-MCNC: 82 MG/DL
UROBILINOGEN URINE: NORMAL
WBC # FLD AUTO: 5.6 K/UL
WHITE BLOOD CELLS URINE: 1 /HPF

## 2022-01-04 ENCOUNTER — NON-APPOINTMENT (OUTPATIENT)
Age: 72
End: 2022-01-04

## 2022-01-28 ENCOUNTER — NON-APPOINTMENT (OUTPATIENT)
Age: 72
End: 2022-01-28

## 2022-01-28 ENCOUNTER — APPOINTMENT (OUTPATIENT)
Dept: CARDIOLOGY | Facility: CLINIC | Age: 72
End: 2022-01-28
Payer: COMMERCIAL

## 2022-01-28 VITALS
TEMPERATURE: 97.2 F | OXYGEN SATURATION: 96 % | WEIGHT: 194 LBS | BODY MASS INDEX: 30.45 KG/M2 | SYSTOLIC BLOOD PRESSURE: 135 MMHG | HEIGHT: 67 IN | DIASTOLIC BLOOD PRESSURE: 80 MMHG | HEART RATE: 57 BPM | RESPIRATION RATE: 16 BRPM

## 2022-01-28 DIAGNOSIS — N40.0 BENIGN PROSTATIC HYPERPLASIA WITHOUT LOWER URINARY TRACT SYMPMS: ICD-10-CM

## 2022-01-28 PROCEDURE — 93000 ELECTROCARDIOGRAM COMPLETE: CPT

## 2022-01-28 PROCEDURE — 99214 OFFICE O/P EST MOD 30 MIN: CPT

## 2022-02-05 ENCOUNTER — RESULT CHARGE (OUTPATIENT)
Age: 72
End: 2022-02-05

## 2022-02-06 NOTE — HISTORY OF PRESENT ILLNESS
[FreeTextEntry1] : 71 year male with PMH of diet controlled diabetes, HTN, and HLD presents for routine follow up.He is s/p left knee replacement 12/2018. He states he is feeling well. He denies any symptoms of chest pain, dyspnea, orthopnea, palpitations, fatigue, edema, near syncope or syncope. He reports he is compliant with his medications. He is intermittent fasting and has lost 20 lbs.  His most recent HgbA1C was 6.3 and proteinuria has resolved. He does not want to be on medication for DM and has started strict diet and has increased his exercise.  He is lifting weights routinely with no symptoms. He walks 2 miles daily.  He had an echocardiogram in 2015 with normal LV function and no significant valvular disease. Nuclear stress test in 12/2018 with no evidence of infarction or inducible ischemia.  He is managing his diabetes with diet.  Lipids 12/21 with LDL 86 so he will increase Crestor to 20 mg daily.  He does weight lifting and walks 3 miles daily with no issues.   Back in 1990, he was told he had a leaky mitral valve.  His last echo was over 5 years ago.

## 2022-02-06 NOTE — REVIEW OF SYSTEMS
[Weight Loss (___ Lbs)] : [unfilled] ~Ulb weight loss [Negative] : Heme/Lymph [FreeTextEntry8] : gets up 2x a night doing well on Rapaflo [FreeTextEntry9] : right knee arthritis S/P L knee replacement

## 2022-02-10 ENCOUNTER — APPOINTMENT (OUTPATIENT)
Dept: CARDIOLOGY | Facility: CLINIC | Age: 72
End: 2022-02-10
Payer: COMMERCIAL

## 2022-02-10 PROCEDURE — 93306 TTE W/DOPPLER COMPLETE: CPT

## 2022-02-11 ENCOUNTER — NON-APPOINTMENT (OUTPATIENT)
Age: 72
End: 2022-02-11

## 2022-02-21 ENCOUNTER — RX RENEWAL (OUTPATIENT)
Age: 72
End: 2022-02-21

## 2022-03-10 LAB
ALBUMIN SERPL ELPH-MCNC: 5.1 G/DL
ALP BLD-CCNC: 85 U/L
ALT SERPL-CCNC: 29 U/L
ANION GAP SERPL CALC-SCNC: 15 MMOL/L
AST SERPL-CCNC: 20 U/L
BILIRUB SERPL-MCNC: 0.5 MG/DL
BUN SERPL-MCNC: 17 MG/DL
CALCIUM SERPL-MCNC: 10.2 MG/DL
CHLORIDE SERPL-SCNC: 101 MMOL/L
CHOLEST SERPL-MCNC: 176 MG/DL
CO2 SERPL-SCNC: 25 MMOL/L
CREAT SERPL-MCNC: 0.76 MG/DL
EGFR: 97 ML/MIN/1.73M2
GLUCOSE SERPL-MCNC: 128 MG/DL
HDLC SERPL-MCNC: 75 MG/DL
LDLC SERPL CALC-MCNC: 77 MG/DL
NONHDLC SERPL-MCNC: 100 MG/DL
POTASSIUM SERPL-SCNC: 4.3 MMOL/L
PROT SERPL-MCNC: 7.6 G/DL
SODIUM SERPL-SCNC: 141 MMOL/L
TRIGL SERPL-MCNC: 114 MG/DL

## 2022-05-17 NOTE — ASSESSMENT
[FreeTextEntry1] : Mobic given per pt req, aware of SE.Venipuncture done in our office, Labs sent out/further recommendations will be made based on lab results. Patient advised to continue present medications with diet/exercise and specialist follow up.Patient will return to the office in 3-4 months for CP\par \par last colonoscopy was 7/2018, follow up in 3 years "to do", has apt sched with Dr. Mariano\par 24 urine was 12/2019=sent out today\par MA 12/2020\par specialists include\par 1. ophthalmology-Dr. Garcia=10/2020\par 2.urology-Dr. Handley\par 3.sees orthopedic on a p.r.n. basis-Dr. Gonzales\par 4.Dr. Lazcano at [place] (hand)-NO FOLLOW UP NEEDED\par 5.podiatry-Dr. Flores\par 6.ENT-Dr. Rod\par 7. cardiology-Dr. Calloway\par 8.Ortho-Dr. Mukherjee\par shingles vaccine discussed, +got covid vaccines/to get flu shot at work in oct\par Reports hepatitis C screening in the past as negative No

## 2022-05-19 ENCOUNTER — RX RENEWAL (OUTPATIENT)
Age: 72
End: 2022-05-19

## 2022-06-24 ENCOUNTER — APPOINTMENT (OUTPATIENT)
Dept: INTERNAL MEDICINE | Facility: CLINIC | Age: 72
End: 2022-06-24
Payer: COMMERCIAL

## 2022-06-24 VITALS
OXYGEN SATURATION: 97 % | DIASTOLIC BLOOD PRESSURE: 80 MMHG | SYSTOLIC BLOOD PRESSURE: 120 MMHG | RESPIRATION RATE: 14 BRPM | BODY MASS INDEX: 30.76 KG/M2 | HEIGHT: 67 IN | TEMPERATURE: 97 F | WEIGHT: 196 LBS | HEART RATE: 83 BPM

## 2022-06-24 PROCEDURE — 99214 OFFICE O/P EST MOD 30 MIN: CPT | Mod: 25

## 2022-06-24 PROCEDURE — 36415 COLL VENOUS BLD VENIPUNCTURE: CPT

## 2022-06-26 LAB
ALBUMIN SERPL ELPH-MCNC: 5 G/DL
ALP BLD-CCNC: 89 U/L
ALT SERPL-CCNC: 21 U/L
ANION GAP SERPL CALC-SCNC: 12 MMOL/L
AST SERPL-CCNC: 20 U/L
BASOPHILS # BLD AUTO: 0.07 K/UL
BASOPHILS NFR BLD AUTO: 1.3 %
BILIRUB SERPL-MCNC: 0.4 MG/DL
BUN SERPL-MCNC: 16 MG/DL
CALCIUM SERPL-MCNC: 10 MG/DL
CHLORIDE SERPL-SCNC: 102 MMOL/L
CHOLEST SERPL-MCNC: 170 MG/DL
CO2 SERPL-SCNC: 25 MMOL/L
CREAT SERPL-MCNC: 0.75 MG/DL
EGFR: 96 ML/MIN/1.73M2
EOSINOPHIL # BLD AUTO: 0.17 K/UL
EOSINOPHIL NFR BLD AUTO: 3.1 %
ESTIMATED AVERAGE GLUCOSE: 146 MG/DL
GLUCOSE SERPL-MCNC: 162 MG/DL
HBA1C MFR BLD HPLC: 6.7 %
HCT VFR BLD CALC: 48.7 %
HDLC SERPL-MCNC: 65 MG/DL
HGB BLD-MCNC: 15.9 G/DL
IMM GRANULOCYTES NFR BLD AUTO: 0.2 %
LDLC SERPL CALC-MCNC: 87 MG/DL
LYMPHOCYTES # BLD AUTO: 1.14 K/UL
LYMPHOCYTES NFR BLD AUTO: 20.9 %
MAGNESIUM SERPL-MCNC: 2 MG/DL
MAN DIFF?: NORMAL
MCHC RBC-ENTMCNC: 30.9 PG
MCHC RBC-ENTMCNC: 32.6 GM/DL
MCV RBC AUTO: 94.6 FL
MONOCYTES # BLD AUTO: 0.55 K/UL
MONOCYTES NFR BLD AUTO: 10.1 %
NEUTROPHILS # BLD AUTO: 3.51 K/UL
NEUTROPHILS NFR BLD AUTO: 64.4 %
NONHDLC SERPL-MCNC: 104 MG/DL
PLATELET # BLD AUTO: 249 K/UL
POTASSIUM SERPL-SCNC: 4 MMOL/L
PROT SERPL-MCNC: 7.2 G/DL
PSA SERPL-MCNC: 7.05 NG/ML
RBC # BLD: 5.15 M/UL
RBC # FLD: 13.2 %
SODIUM SERPL-SCNC: 139 MMOL/L
TRIGL SERPL-MCNC: 85 MG/DL
WBC # FLD AUTO: 5.45 K/UL

## 2022-06-27 ENCOUNTER — TRANSCRIPTION ENCOUNTER (OUTPATIENT)
Age: 72
End: 2022-06-27

## 2022-06-27 ENCOUNTER — NON-APPOINTMENT (OUTPATIENT)
Age: 72
End: 2022-06-27

## 2022-07-11 NOTE — ASSESSMENT
[FreeTextEntry1] : Venipuncture done in our office, Labs sent out/further recommendations will be made based on lab results. Patient advised to continue present medications with diet/exercise and specialist follow up.Patient will return to the office in dec for CP/3months\par \par last colonoscopy was 10/2021\par 24 urine was 9/2021\par MA 12/2021\par specialists include\par 1. ophthalmology-Dr. Garcia=2/2022\par 2.urology-Dr. Handley\par 3.sees orthopedic on a p.r.n. basis-Dr. Gonzales\par 4.Dr. Lazcano at [place] (hand)-NO FOLLOW UP NEEDED\par 5.podiatry-Dr. Flores\par 6.ENT-Dr. Rod\par 7. cardiology-Dr. Calloway\par 8.Ortho-Dr. Mukherjee\par shingles vaccine discussed, +got covid vaccines X3\par Reports hepatitis C screening in the past as negative\par echo 2/2022

## 2022-07-11 NOTE — HISTORY OF PRESENT ILLNESS
[FreeTextEntry1] : Patient presents for followup on hypertension/hyperlipidemia/type 2 diabetes. Patient is currently fasting for today's labs. Patient is currently on Lotrel/hydrochlorothiazide for hypertension, on Crestor for hyperlipidemia and is controlling his type 2 diabetes dietarily. \par -got covid vaccines X3\par -needs PSA per \par \par \par \par

## 2022-07-18 ENCOUNTER — RX RENEWAL (OUTPATIENT)
Age: 72
End: 2022-07-18

## 2022-11-14 ENCOUNTER — RX RENEWAL (OUTPATIENT)
Age: 72
End: 2022-11-14

## 2022-12-19 ENCOUNTER — RX RENEWAL (OUTPATIENT)
Age: 72
End: 2022-12-19

## 2022-12-23 ENCOUNTER — APPOINTMENT (OUTPATIENT)
Dept: INTERNAL MEDICINE | Facility: CLINIC | Age: 72
End: 2022-12-23

## 2022-12-23 VITALS
WEIGHT: 200 LBS | HEART RATE: 70 BPM | BODY MASS INDEX: 31.32 KG/M2 | DIASTOLIC BLOOD PRESSURE: 80 MMHG | SYSTOLIC BLOOD PRESSURE: 124 MMHG | RESPIRATION RATE: 13 BRPM

## 2022-12-23 DIAGNOSIS — M51.36 OTHER INTERVERTEBRAL DISC DEGENERATION, LUMBAR REGION: ICD-10-CM

## 2022-12-23 PROCEDURE — 99214 OFFICE O/P EST MOD 30 MIN: CPT | Mod: 25

## 2022-12-23 PROCEDURE — 36415 COLL VENOUS BLD VENIPUNCTURE: CPT

## 2022-12-23 RX ORDER — MELOXICAM 15 MG/1
15 TABLET ORAL
Qty: 30 | Refills: 0 | Status: DISCONTINUED | COMMUNITY
Start: 2021-09-17 | End: 2022-12-23

## 2022-12-26 LAB
ALBUMIN SERPL ELPH-MCNC: 4.8 G/DL
ALP BLD-CCNC: 95 U/L
ALT SERPL-CCNC: 34 U/L
ANION GAP SERPL CALC-SCNC: 20 MMOL/L
AST SERPL-CCNC: 55 U/L
BASOPHILS # BLD AUTO: 0.08 K/UL
BASOPHILS NFR BLD AUTO: 1.5 %
BILIRUB SERPL-MCNC: 0.4 MG/DL
BUN SERPL-MCNC: 18 MG/DL
CALCIUM SERPL-MCNC: 10 MG/DL
CHLORIDE SERPL-SCNC: 100 MMOL/L
CHOLEST SERPL-MCNC: 159 MG/DL
CO2 SERPL-SCNC: 19 MMOL/L
CREAT SERPL-MCNC: 0.74 MG/DL
CREAT SPEC-SCNC: 62 MG/DL
EGFR: 97 ML/MIN/1.73M2
EOSINOPHIL # BLD AUTO: 0.18 K/UL
EOSINOPHIL NFR BLD AUTO: 3.3 %
ESTIMATED AVERAGE GLUCOSE: 166 MG/DL
GLUCOSE SERPL-MCNC: 135 MG/DL
HBA1C MFR BLD HPLC: 7.4 %
HCT VFR BLD CALC: 45.3 %
HDLC SERPL-MCNC: 62 MG/DL
HGB BLD-MCNC: 15.2 G/DL
IMM GRANULOCYTES NFR BLD AUTO: 0 %
LDLC SERPL CALC-MCNC: 76 MG/DL
LYMPHOCYTES # BLD AUTO: 1.05 K/UL
LYMPHOCYTES NFR BLD AUTO: 19.4 %
MAGNESIUM SERPL-MCNC: 2 MG/DL
MAN DIFF?: NORMAL
MCHC RBC-ENTMCNC: 29.9 PG
MCHC RBC-ENTMCNC: 33.6 GM/DL
MCV RBC AUTO: 89 FL
MICROALBUMIN 24H UR DL<=1MG/L-MCNC: 1.8 MG/DL
MICROALBUMIN/CREAT 24H UR-RTO: 30 MG/G
MONOCYTES # BLD AUTO: 0.57 K/UL
MONOCYTES NFR BLD AUTO: 10.5 %
NEUTROPHILS # BLD AUTO: 3.53 K/UL
NEUTROPHILS NFR BLD AUTO: 65.3 %
NONHDLC SERPL-MCNC: 97 MG/DL
PLATELET # BLD AUTO: 265 K/UL
POTASSIUM SERPL-SCNC: 4 MMOL/L
PROT SERPL-MCNC: 7.6 G/DL
PSA SERPL-MCNC: 7.08 NG/ML
RBC # BLD: 5.09 M/UL
RBC # FLD: 13 %
SODIUM SERPL-SCNC: 139 MMOL/L
TRIGL SERPL-MCNC: 104 MG/DL
WBC # FLD AUTO: 5.41 K/UL

## 2022-12-26 NOTE — ASSESSMENT
[FreeTextEntry1] : Naproxen/Skelaxin given, to continue chiropractic care.  Venipuncture done in our office, Labs sent out/further recommendations will be made based on lab results. Patient advised to continue present medications with diet/exercise and specialist follow up.Patient will return to the office in 4months for CP\par \par last colonoscopy was 10/2021\par 24 urine was 9/2021=container given to repeat\par MA =sent out\par specialists include\par 1. ophthalmology-Dr. Garcia=2/2022\par 2.urology-Dr. Handley\par 3.sees orthopedic on a p.r.n. basis-Dr. Gonzales\par 4.Dr. Lazcano at [place] (hand)-NO FOLLOW UP NEEDED\par 5.podiatry-Dr. Flores\par 6.ENT-Dr. Rod\par 7. cardiology-Dr. Calloway\par 8.Ortho-Dr. Mukherjee\par 9.  Chiropractor as needed\par shingles vaccine discussed, +got covid vaccines X3\par Reports hepatitis C screening in the past as negative\par echo 2/2022

## 2022-12-26 NOTE — HISTORY OF PRESENT ILLNESS
[FreeTextEntry1] : Patient presents for followup on hypertension/hyperlipidemia/type 2 diabetes. Patient is currently fasting for today's labs. Patient is currently on Lotrel/hydrochlorothiazide for hypertension, on Crestor for hyperlipidemia and is controlling his type 2 diabetes dietarily. \par -got covid vaccines X3\par -needs PSA per \par -Patient states his low back was acting up with right-sided sciatica, saw chiropractor with benefit.  Patient had x-ray done showing DDD, would like medication to have at home to use as needed but currently it feels okay\par \par \par \par

## 2022-12-27 ENCOUNTER — NON-APPOINTMENT (OUTPATIENT)
Age: 72
End: 2022-12-27

## 2022-12-27 ENCOUNTER — TRANSCRIPTION ENCOUNTER (OUTPATIENT)
Age: 72
End: 2022-12-27

## 2023-01-26 ENCOUNTER — APPOINTMENT (OUTPATIENT)
Dept: INTERNAL MEDICINE | Facility: CLINIC | Age: 73
End: 2023-01-26
Payer: COMMERCIAL

## 2023-01-26 VITALS
BODY MASS INDEX: 31.39 KG/M2 | HEIGHT: 67 IN | WEIGHT: 200 LBS | DIASTOLIC BLOOD PRESSURE: 80 MMHG | SYSTOLIC BLOOD PRESSURE: 124 MMHG | OXYGEN SATURATION: 97 % | HEART RATE: 73 BPM | RESPIRATION RATE: 14 BRPM

## 2023-01-26 PROCEDURE — 99213 OFFICE O/P EST LOW 20 MIN: CPT | Mod: 25

## 2023-01-26 PROCEDURE — 36415 COLL VENOUS BLD VENIPUNCTURE: CPT

## 2023-01-26 RX ORDER — TADALAFIL 5 MG/1
5 TABLET ORAL
Qty: 5 | Refills: 0 | Status: DISCONTINUED | COMMUNITY
Start: 2022-01-05 | End: 2023-01-26

## 2023-01-26 RX ORDER — TADALAFIL 20 MG/1
20 TABLET ORAL
Qty: 6 | Refills: 0 | Status: DISCONTINUED | COMMUNITY
Start: 2022-06-18 | End: 2023-01-26

## 2023-01-26 NOTE — ASSESSMENT
[FreeTextEntry1] : Venipuncture done in our office, Labs sent out/further recommendations will be made based on lab results. Patient advised to continue present medications with diet/exercise and specialist follow up.Patient will return to the office as scheduled for regular follow-up\par \par last colonoscopy was 10/2021\par 24 urine was 9/2021=container given to repeat "to do"\par MA =12/2022\par specialists include\par 1. ophthalmology-Dr. Garcia=2/2022\par 2.urology-Dr. Handley\par 3.sees orthopedic on a p.r.n. basis-Dr. Gonzales\par 4.Dr. Lazcano at [place] (hand)-NO FOLLOW UP NEEDED\par 5.podiatry-Dr. Flores\par 6.ENT-Dr. Rod\par 7. cardiology-Dr. Calloway\par 8.Ortho-Dr. Mukherjee\par 9.  Chiropractor as needed\par shingles vaccine discussed, +got covid vaccines X3\par Reports hepatitis C screening in the past as negative\par echo 2/2022

## 2023-01-26 NOTE — HISTORY OF PRESENT ILLNESS
[FreeTextEntry1] : Patient presents for followup\par -Patient is currently on Lotrel/hydrochlorothiazide for hypertension\par -Last Blood work showed AST enzyme elevated at 44, abnormal results discussed with patient and questions answered, patient with known fatty liver, pt does NOT drink ETOH much\par -got covid vaccines X3\par \par \par \par \par

## 2023-01-27 ENCOUNTER — TRANSCRIPTION ENCOUNTER (OUTPATIENT)
Age: 73
End: 2023-01-27

## 2023-01-27 ENCOUNTER — NON-APPOINTMENT (OUTPATIENT)
Age: 73
End: 2023-01-27

## 2023-01-27 LAB
ALBUMIN SERPL ELPH-MCNC: 4.8 G/DL
ALP BLD-CCNC: 104 U/L
ALT SERPL-CCNC: 19 U/L
AST SERPL-CCNC: 19 U/L
BILIRUB DIRECT SERPL-MCNC: 0.1 MG/DL
BILIRUB INDIRECT SERPL-MCNC: 0.2 MG/DL
BILIRUB SERPL-MCNC: 0.3 MG/DL
PROT SERPL-MCNC: 7.2 G/DL

## 2023-02-17 ENCOUNTER — APPOINTMENT (OUTPATIENT)
Dept: CARDIOLOGY | Facility: CLINIC | Age: 73
End: 2023-02-17

## 2023-02-17 ENCOUNTER — APPOINTMENT (OUTPATIENT)
Dept: CARDIOLOGY | Facility: CLINIC | Age: 73
End: 2023-02-17
Payer: COMMERCIAL

## 2023-02-17 ENCOUNTER — NON-APPOINTMENT (OUTPATIENT)
Age: 73
End: 2023-02-17

## 2023-02-17 VITALS
TEMPERATURE: 98 F | WEIGHT: 199 LBS | OXYGEN SATURATION: 95 % | HEART RATE: 68 BPM | BODY MASS INDEX: 31.23 KG/M2 | HEIGHT: 67 IN | DIASTOLIC BLOOD PRESSURE: 80 MMHG | SYSTOLIC BLOOD PRESSURE: 126 MMHG

## 2023-02-17 VITALS
DIASTOLIC BLOOD PRESSURE: 80 MMHG | SYSTOLIC BLOOD PRESSURE: 120 MMHG | BODY MASS INDEX: 31.17 KG/M2 | TEMPERATURE: 98 F | OXYGEN SATURATION: 95 % | WEIGHT: 199 LBS | HEART RATE: 68 BPM

## 2023-02-17 PROCEDURE — 99214 OFFICE O/P EST MOD 30 MIN: CPT | Mod: 25

## 2023-02-17 PROCEDURE — 93000 ELECTROCARDIOGRAM COMPLETE: CPT

## 2023-03-12 NOTE — CARDIOLOGY SUMMARY
[de-identified] : 2/17/2023:  Sinus  Rhythm  \par Right ventricular conduction delay [de-identified] : 12/3/2018:  normal pharmacologic nuclear stress with post stress LVEF 58% [de-identified] : 2/10/2022:  LVEF 54%, mild mitral regurgitation, aortic sclerosis

## 2023-03-12 NOTE — HISTORY OF PRESENT ILLNESS
[FreeTextEntry1] : 71 year male with PMH of diet controlled diabetes, HTN, and HLD presents for routine follow up.He is s/p left knee replacement 12/2018. He states he is feeling well. He denies any symptoms of chest pain, dyspnea, orthopnea, palpitations, fatigue, edema, near syncope or syncope. He reports he is compliant with his medications. He is intermittent fasting and has lost 20 lbs.  His most recent HgbA1C was 7.4  He does not want to be on medication for DM and has started strict diet and has increased his exercise.  He is lifting weights routinely with no symptoms. He walks 2 miles daily.  He had an echocardiogram in 2015 with normal LV function and no significant valvular disease. Nuclear stress test in 12/2018 with no evidence of infarction or inducible ischemia.  He is managing his diabetes with diet.  Lipids 12/22 with LDL 76 so he will increase Crestor to 20 mg daily.  He does weight lifting and walks 3 miles daily with no issues. He can climb 10 flights of stairs with no issues.

## 2023-03-12 NOTE — REVIEW OF SYSTEMS
[Weight Loss (___ Lbs)] : [unfilled] ~Ulb weight loss [Negative] : Heme/Lymph [FreeTextEntry8] : gets up 2x a night doing well on Rapaflo [FreeTextEntry9] : right knee arthritis - conisdering replacement in the future S/P L knee replacement

## 2023-03-29 ENCOUNTER — APPOINTMENT (OUTPATIENT)
Dept: INTERNAL MEDICINE | Facility: CLINIC | Age: 73
End: 2023-03-29
Payer: COMMERCIAL

## 2023-03-29 VITALS
BODY MASS INDEX: 31.39 KG/M2 | RESPIRATION RATE: 13 BRPM | OXYGEN SATURATION: 97 % | HEART RATE: 60 BPM | WEIGHT: 200 LBS | HEIGHT: 67 IN | SYSTOLIC BLOOD PRESSURE: 120 MMHG | DIASTOLIC BLOOD PRESSURE: 70 MMHG

## 2023-03-29 PROCEDURE — 36415 COLL VENOUS BLD VENIPUNCTURE: CPT

## 2023-03-29 PROCEDURE — 99397 PER PM REEVAL EST PAT 65+ YR: CPT | Mod: 25

## 2023-03-29 NOTE — ASSESSMENT
[FreeTextEntry1] : 72-year-old male with history of hypertension which is controlled with present medications, hyperlipidemia which is also controlled and type 2 diabetes previously much better control the patient having a good lifestyle changes but unfortunately weight gain with worsening diabetes control.  He has declined medications.  \par Therefore encouraged continued/improved diet and exercise\par  \par Currently medically stable without any active medical issues.\par \par BPH with previous biopsy being negative with chronically increased PSA currently stable December 2022 blood test.  Follow-up with urology as scheduled.\par \par He is to continue present medications\par \par Colonoscopy October 2021 with follow-up in 5 years\par Ophthalmology yearly.  August 2022 with no diabetic retinopathy.\par \par All of the vaccines up-to-date\par Already received influenza and COVID vaccines\par \par Venipuncture done today in the office\par \par Followup in 3 months

## 2023-03-29 NOTE — HEALTH RISK ASSESSMENT
[Good] : ~his/her~  mood as  good [Yes] : Yes [Monthly or less (1 pt)] : Monthly or less (1 point) [1 or 2 (0 pts)] : 1 or 2 (0 points) [Never (0 pts)] : Never (0 points) [No] : In the past 12 months have you used drugs other than those required for medical reasons? No [No falls in past year] : Patient reported no falls in the past year [0] : 2) Feeling down, depressed, or hopeless: Not at all (0) [PHQ-2 Negative - No further assessment needed] : PHQ-2 Negative - No further assessment needed [No Retinopathy] : No retinopathy [None] : None [Alone] : lives alone [Employed] : employed [Graduate School] : graduate school [# Of Children ___] : has [unfilled] children [Feels Safe at Home] : Feels safe at home [Fully functional (bathing, dressing, toileting, transferring, walking, feeding)] : Fully functional (bathing, dressing, toileting, transferring, walking, feeding) [Fully functional (using the telephone, shopping, preparing meals, housekeeping, doing laundry, using] : Fully functional and needs no help or supervision to perform IADLs (using the telephone, shopping, preparing meals, housekeeping, doing laundry, using transportation, managing medications and managing finances) [Smoke Detector] : smoke detector [Carbon Monoxide Detector] : carbon monoxide detector [Seat Belt] :  uses seat belt [Sunscreen] : uses sunscreen [Reviewed no changes] : Reviewed, no changes [Never] : Never [Discussed at today's visit] : Advance Directives Discussed at today's visit [Designated Healthcare Proxy] : Designated healthcare proxy [] :  [Audit-CScore] : 1 [de-identified] : ACTIVE [de-identified] : improved [[id number]] : 0 [EyeExamDate] : 08/22 [Change in mental status noted] : No change in mental status noted [Reports changes in hearing] : Reports no changes in hearing [Reports changes in vision] : Reports no changes in vision [Reports changes in dental health] : Reports no changes in dental health [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

## 2023-03-29 NOTE — HISTORY OF PRESENT ILLNESS
[FreeTextEntry1] : 72-year-old male with history of diet-controlled type 2 diabetes, hypertension, hyperlipidemia and obesity presents for his yearly physical.\par \par The patient diabetes was not adequately controlled with medication recommended which the patient declined.He has made some good lifestyle changes with diet mainly doing intermittent fasting and some exercise with about 20 pounds of weight loss.\par Hemoglobin A1c was much better at 6.1 but unfortunately has been increasing with last at 7.4.  He has gotten back to better diet and exercise with some weight loss\par \par Patient's history also includes BPH with elevated PSA and has had biopsies in the past and follows with urologist . 2019 with urology showed PSA increased. He states he had an MRI which showed no evidence of any prostatic nodules.\par Most recent PSA December 2022 stable at 7.08.  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 no recent issues\par \par Otherwise generally is feeling fine without complaints including no chest pain, palpitations, shortness of breath or edema.\par \par The patient had progressive arthritis of his knees especially his left and had a left total knee replacement December 2018 with great success. 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

## 2023-03-31 ENCOUNTER — NON-APPOINTMENT (OUTPATIENT)
Age: 73
End: 2023-03-31

## 2023-03-31 ENCOUNTER — TRANSCRIPTION ENCOUNTER (OUTPATIENT)
Age: 73
End: 2023-03-31

## 2023-03-31 LAB
ALBUMIN SERPL ELPH-MCNC: 4.8 G/DL
ALP BLD-CCNC: 82 U/L
ALT SERPL-CCNC: 19 U/L
ANION GAP SERPL CALC-SCNC: 13 MMOL/L
APPEARANCE: CLEAR
AST SERPL-CCNC: 18 U/L
BACTERIA: NEGATIVE
BASOPHILS # BLD AUTO: 0.08 K/UL
BASOPHILS NFR BLD AUTO: 1.5 %
BILIRUB SERPL-MCNC: 0.3 MG/DL
BILIRUBIN URINE: NEGATIVE
BLOOD URINE: NEGATIVE
BUN SERPL-MCNC: 17 MG/DL
CALCIUM SERPL-MCNC: 9.9 MG/DL
CHLORIDE SERPL-SCNC: 102 MMOL/L
CHOLEST SERPL-MCNC: 162 MG/DL
CO2 SERPL-SCNC: 24 MMOL/L
COLOR: NORMAL
CREAT SERPL-MCNC: 0.73 MG/DL
EGFR: 97 ML/MIN/1.73M2
EOSINOPHIL # BLD AUTO: 0.11 K/UL
EOSINOPHIL NFR BLD AUTO: 2.1 %
ESTIMATED AVERAGE GLUCOSE: 166 MG/DL
GLUCOSE QUALITATIVE U: NEGATIVE
GLUCOSE SERPL-MCNC: 144 MG/DL
HBA1C MFR BLD HPLC: 7.4 %
HCT VFR BLD CALC: 44.7 %
HDLC SERPL-MCNC: 58 MG/DL
HGB BLD-MCNC: 15.2 G/DL
HYALINE CASTS: 0 /LPF
IMM GRANULOCYTES NFR BLD AUTO: 0.2 %
KETONES URINE: NEGATIVE
LDLC SERPL CALC-MCNC: 83 MG/DL
LEUKOCYTE ESTERASE URINE: NEGATIVE
LYMPHOCYTES # BLD AUTO: 1.09 K/UL
LYMPHOCYTES NFR BLD AUTO: 20.8 %
MAGNESIUM SERPL-MCNC: 2 MG/DL
MAN DIFF?: NORMAL
MCHC RBC-ENTMCNC: 30.9 PG
MCHC RBC-ENTMCNC: 34 GM/DL
MCV RBC AUTO: 90.9 FL
MICROSCOPIC-UA: NORMAL
MONOCYTES # BLD AUTO: 0.56 K/UL
MONOCYTES NFR BLD AUTO: 10.7 %
NEUTROPHILS # BLD AUTO: 3.38 K/UL
NEUTROPHILS NFR BLD AUTO: 64.7 %
NITRITE URINE: NEGATIVE
NONHDLC SERPL-MCNC: 104 MG/DL
PH URINE: 6.5
PLATELET # BLD AUTO: 246 K/UL
POTASSIUM SERPL-SCNC: 4.3 MMOL/L
PROT SERPL-MCNC: 7.1 G/DL
PROTEIN URINE: NORMAL
RBC # BLD: 4.92 M/UL
RBC # FLD: 13 %
RED BLOOD CELLS URINE: 1 /HPF
SODIUM SERPL-SCNC: 140 MMOL/L
SPECIFIC GRAVITY URINE: 1.02
SQUAMOUS EPITHELIAL CELLS: 0 /HPF
TRIGL SERPL-MCNC: 108 MG/DL
UROBILINOGEN URINE: NORMAL
WBC # FLD AUTO: 5.23 K/UL
WHITE BLOOD CELLS URINE: 0 /HPF

## 2023-05-22 ENCOUNTER — RX RENEWAL (OUTPATIENT)
Age: 73
End: 2023-05-22

## 2023-08-16 LAB
CREAT 24H UR-MCNC: 1.8 G/24 H
CREAT ?TM UR-MCNC: 80 MG/DL
PROT 24H UR-MRATE: 12 MG/DL
PROT ?TM UR-MCNC: 24 HR
PROT UR-MCNC: 264 MG/24 H
SPECIMEN VOL 24H UR: 2200 ML

## 2023-09-08 ENCOUNTER — APPOINTMENT (OUTPATIENT)
Dept: INTERNAL MEDICINE | Facility: CLINIC | Age: 73
End: 2023-09-08
Payer: COMMERCIAL

## 2023-09-08 VITALS
HEART RATE: 77 BPM | WEIGHT: 194 LBS | OXYGEN SATURATION: 98 % | BODY MASS INDEX: 30.45 KG/M2 | RESPIRATION RATE: 14 BRPM | HEIGHT: 67 IN

## 2023-09-08 VITALS
DIASTOLIC BLOOD PRESSURE: 80 MMHG | BODY MASS INDEX: 30.38 KG/M2 | SYSTOLIC BLOOD PRESSURE: 125 MMHG | WEIGHT: 194 LBS | HEART RATE: 77 BPM | RESPIRATION RATE: 14 BRPM

## 2023-09-08 DIAGNOSIS — H10.9 UNSPECIFIED CONJUNCTIVITIS: ICD-10-CM

## 2023-09-08 DIAGNOSIS — Z23 ENCOUNTER FOR IMMUNIZATION: ICD-10-CM

## 2023-09-08 PROCEDURE — G0008: CPT

## 2023-09-08 PROCEDURE — 90662 IIV NO PRSV INCREASED AG IM: CPT

## 2023-09-08 PROCEDURE — 99214 OFFICE O/P EST MOD 30 MIN: CPT | Mod: 25

## 2023-09-08 PROCEDURE — 36415 COLL VENOUS BLD VENIPUNCTURE: CPT

## 2023-09-08 RX ORDER — FINASTERIDE 5 MG/1
5 TABLET, FILM COATED ORAL
Refills: 0 | Status: ACTIVE | COMMUNITY
Start: 2023-09-08

## 2023-09-09 LAB
ALBUMIN SERPL ELPH-MCNC: 4.9 G/DL
ALP BLD-CCNC: 93 U/L
ALT SERPL-CCNC: 24 U/L
ANION GAP SERPL CALC-SCNC: 14 MMOL/L
AST SERPL-CCNC: 19 U/L
BASOPHILS # BLD AUTO: 0.08 K/UL
BASOPHILS NFR BLD AUTO: 0.9 %
BILIRUB SERPL-MCNC: 0.4 MG/DL
BUN SERPL-MCNC: 15 MG/DL
CALCIUM SERPL-MCNC: 9.6 MG/DL
CHLORIDE SERPL-SCNC: 101 MMOL/L
CHOLEST SERPL-MCNC: 168 MG/DL
CO2 SERPL-SCNC: 22 MMOL/L
CREAT SERPL-MCNC: 0.67 MG/DL
EGFR: 99 ML/MIN/1.73M2
EOSINOPHIL # BLD AUTO: 0.14 K/UL
EOSINOPHIL NFR BLD AUTO: 1.6 %
ESTIMATED AVERAGE GLUCOSE: 166 MG/DL
GLUCOSE SERPL-MCNC: 152 MG/DL
HBA1C MFR BLD HPLC: 7.4 %
HCT VFR BLD CALC: 45.7 %
HDLC SERPL-MCNC: 64 MG/DL
HGB BLD-MCNC: 14.8 G/DL
IMM GRANULOCYTES NFR BLD AUTO: 0.2 %
LDLC SERPL CALC-MCNC: 79 MG/DL
LYMPHOCYTES # BLD AUTO: 1.09 K/UL
LYMPHOCYTES NFR BLD AUTO: 12.8 %
MAGNESIUM SERPL-MCNC: 2.1 MG/DL
MAN DIFF?: NORMAL
MCHC RBC-ENTMCNC: 30.1 PG
MCHC RBC-ENTMCNC: 32.4 GM/DL
MCV RBC AUTO: 92.9 FL
MONOCYTES # BLD AUTO: 0.64 K/UL
MONOCYTES NFR BLD AUTO: 7.5 %
NEUTROPHILS # BLD AUTO: 6.56 K/UL
NEUTROPHILS NFR BLD AUTO: 77 %
NONHDLC SERPL-MCNC: 104 MG/DL
PLATELET # BLD AUTO: 270 K/UL
POTASSIUM SERPL-SCNC: 4 MMOL/L
PROT SERPL-MCNC: 7.2 G/DL
PSA SERPL-MCNC: 7.32 NG/ML
RBC # BLD: 4.92 M/UL
RBC # FLD: 13.5 %
SODIUM SERPL-SCNC: 137 MMOL/L
TRIGL SERPL-MCNC: 144 MG/DL
TSH SERPL-ACNC: 1.13 UIU/ML
WBC # FLD AUTO: 8.53 K/UL

## 2023-09-09 NOTE — PHYSICAL EXAM
[General Appearance - In No Acute Distress] : in no acute distress [] : no respiratory distress [Respiration, Rhythm And Depth] : normal respiratory rhythm and effort [Auscultation Breath Sounds / Voice Sounds] : lungs were clear to auscultation bilaterally [Heart Rate And Rhythm] : heart rate was normal and rhythm regular [Affect] : the affect was normal [Mood] : the mood was normal [PERRL] : pupils equal round and reactive to light [EOMI] : extraocular movements intact [de-identified] : + Scleral erythema right eye

## 2023-09-09 NOTE — ASSESSMENT
[FreeTextEntry1] : TobraDex ophthalmic suspension given.  HD Flu vaccine given without side effects or reaction.Venipuncture done in our office, Labs sent out/further recommendations will be made based on lab results. Patient advised to continue present medications with diet/exercise and specialist follow up.Patient will return to the office in 3-4months  Dr. De Jesus was present in office building while I examined patient  last colonoscopy was 10/2021 with next in 5 years 24 urine was 8/2023 MA =3/2023 specialists include 1. ophthalmology-Dr. Garcia= to call for report 2.urology-Dr. Handley 3.sees orthopedic on a p.r.n. basis-Dr. Gonzales 4.Dr. Lazcano at [employer] (hand)-NO FOLLOW UP NEEDED 5.podiatry-Dr. Flores 6.ENT-Dr. Rod 7. cardiology-Dr. Calloway 8.Ortho-Dr. Mukherjee 9.  Chiropractor as needed shingles vaccine discussed, +got covid vaccines X3 Reports hepatitis C screening in the past as negative echo 2/2022

## 2023-09-09 NOTE — HISTORY OF PRESENT ILLNESS
[FreeTextEntry1] : Patient presents for followup on hypertension/hyperlipidemia/type 2 diabetes. Patient is currently fasting for today's labs. Patient is currently on Lotrel/hydrochlorothiazide for hypertension, on Crestor for hyperlipidemia and is controlling his type 2 diabetes dietarily.  -got covid vaccines X3 -needs PSA per  -Patient reports episode of urinary retention on 8/8, finasteride was given with resolution of issue, has  follow-up sched -Patient complaining of right eye irritation/erythema for 1-2 days with slight discharge.  Patient has no vision changes or eye pain

## 2023-09-09 NOTE — PHYSICAL EXAM
[General Appearance - In No Acute Distress] : in no acute distress [] : no respiratory distress [Respiration, Rhythm And Depth] : normal respiratory rhythm and effort [Auscultation Breath Sounds / Voice Sounds] : lungs were clear to auscultation bilaterally [Heart Rate And Rhythm] : heart rate was normal and rhythm regular [Affect] : the affect was normal [Mood] : the mood was normal [PERRL] : pupils equal round and reactive to light [EOMI] : extraocular movements intact [de-identified] : + Scleral erythema right eye

## 2023-09-09 NOTE — ASSESSMENT
[FreeTextEntry1] : TobraDex ophthalmic suspension given.  HD Flu vaccine given without side effects or reaction.Venipuncture done in our office, Labs sent out/further recommendations will be made based on lab results. Patient advised to continue present medications with diet/exercise and specialist follow up.Patient will return to the office in 3-4months  Dr. De Jesus was present in office building while I examined patient  last colonoscopy was 10/2021 with next in 5 years 24 urine was 8/2023 MA =3/2023 specialists include 1. ophthalmology-Dr. Garcia= to call for report 2.urology-Dr. Handley 3.sees orthopedic on a p.r.n. basis-Dr. Gonzales 4.Dr. Lazcano at [place] (hand)-NO FOLLOW UP NEEDED 5.podiatry-Dr. Flores 6.ENT-Dr. Rod 7. cardiology-Dr. Calloway 8.Ortho-Dr. Mukherjee 9.  Chiropractor as needed shingles vaccine discussed, +got covid vaccines X3 Reports hepatitis C screening in the past as negative echo 2/2022

## 2023-09-15 ASSESSMENT — KOOS JR
STANDING UPRIGHT: MILD
HOW SEVERE IS YOUR KNEE STIFFNESS AFTER FIRST WAKING IN MORNING: MODERATE
IMPORTED KOOS JR SCORE: 63.78
KOOS JR RAW SCORE: 9
IMPORTED KOOS JR SCORE: 70.7
TWISING OR PIVOTING ON KNEE: MILD
IMPORTED FORM: YES
STRAIGHTENING KNEE FULLY: MILD
BENDING TO THE FLOOR TO PICK UP OBJECT: MILD
KOOS JR RAW SCORE: 8
GOING UP OR DOWN STAIRS: MILD
TWISING OR PIVOTING ON KNEE: MODERATE
RISING FROM SITTING: MILD
HOW SEVERE IS YOUR KNEE STIFFNESS AFTER FIRST WAKING IN MORNING: MILD
KOOS JR RAW SCORE: 6
IMPORTED KOOS JR SCORE: 65.99

## 2023-10-10 ENCOUNTER — RX RENEWAL (OUTPATIENT)
Age: 73
End: 2023-10-10

## 2023-11-01 ENCOUNTER — RX RENEWAL (OUTPATIENT)
Age: 73
End: 2023-11-01

## 2023-12-31 PROBLEM — Z23 NEED FOR VACCINATION WITH 13-POLYVALENT PNEUMOCOCCAL CONJUGATE VACCINE: Status: RESOLVED | Noted: 2017-07-05 | Resolved: 2023-12-31

## 2024-02-15 ENCOUNTER — APPOINTMENT (OUTPATIENT)
Dept: CARDIOLOGY | Facility: CLINIC | Age: 74
End: 2024-02-15
Payer: COMMERCIAL

## 2024-02-15 PROCEDURE — A9502: CPT

## 2024-02-15 PROCEDURE — 93015 CV STRESS TEST SUPVJ I&R: CPT

## 2024-02-15 PROCEDURE — 78452 HT MUSCLE IMAGE SPECT MULT: CPT

## 2024-02-15 RX ORDER — AMOXICILLIN 500 MG/1
500 TABLET, FILM COATED ORAL
Qty: 8 | Refills: 2 | Status: DISCONTINUED | COMMUNITY
Start: 2019-02-27 | End: 2024-02-15

## 2024-02-16 ENCOUNTER — TRANSCRIPTION ENCOUNTER (OUTPATIENT)
Age: 74
End: 2024-02-16

## 2024-02-16 ENCOUNTER — APPOINTMENT (OUTPATIENT)
Dept: CARDIOLOGY | Facility: CLINIC | Age: 74
End: 2024-02-16
Payer: COMMERCIAL

## 2024-02-16 ENCOUNTER — NON-APPOINTMENT (OUTPATIENT)
Age: 74
End: 2024-02-16

## 2024-02-16 VITALS
BODY MASS INDEX: 30.61 KG/M2 | SYSTOLIC BLOOD PRESSURE: 128 MMHG | HEIGHT: 67 IN | WEIGHT: 195 LBS | DIASTOLIC BLOOD PRESSURE: 80 MMHG | HEART RATE: 72 BPM | OXYGEN SATURATION: 97 %

## 2024-02-16 DIAGNOSIS — I34.0 NONRHEUMATIC MITRAL (VALVE) INSUFFICIENCY: ICD-10-CM

## 2024-02-16 PROCEDURE — 99214 OFFICE O/P EST MOD 30 MIN: CPT | Mod: 25

## 2024-02-16 PROCEDURE — 93000 ELECTROCARDIOGRAM COMPLETE: CPT

## 2024-02-16 RX ORDER — TOBRAMYCIN AND DEXAMETHASONE 3; 1 MG/ML; MG/ML
0.3-0.1 SUSPENSION/ DROPS OPHTHALMIC EVERY 4 HOURS
Qty: 1 | Refills: 0 | Status: DISCONTINUED | COMMUNITY
Start: 2023-09-08 | End: 2024-02-16

## 2024-02-16 RX ORDER — METAXALONE 800 MG/1
800 TABLET ORAL 3 TIMES DAILY
Qty: 30 | Refills: 0 | Status: DISCONTINUED | COMMUNITY
Start: 2022-12-23 | End: 2024-02-16

## 2024-02-16 RX ORDER — NAPROXEN 500 MG/1
500 TABLET, DELAYED RELEASE ORAL
Qty: 30 | Refills: 0 | Status: DISCONTINUED | COMMUNITY
Start: 2022-12-23 | End: 2024-02-16

## 2024-02-20 ENCOUNTER — NON-APPOINTMENT (OUTPATIENT)
Age: 74
End: 2024-02-20

## 2024-02-27 ENCOUNTER — APPOINTMENT (OUTPATIENT)
Dept: INTERNAL MEDICINE | Facility: CLINIC | Age: 74
End: 2024-02-27
Payer: COMMERCIAL

## 2024-02-27 VITALS
BODY MASS INDEX: 30.61 KG/M2 | HEIGHT: 67 IN | RESPIRATION RATE: 12 BRPM | DIASTOLIC BLOOD PRESSURE: 75 MMHG | WEIGHT: 195 LBS | OXYGEN SATURATION: 98 % | HEART RATE: 72 BPM | SYSTOLIC BLOOD PRESSURE: 120 MMHG

## 2024-02-27 DIAGNOSIS — E11.9 TYPE 2 DIABETES MELLITUS W/OUT COMPLICATIONS: ICD-10-CM

## 2024-02-27 PROCEDURE — 36415 COLL VENOUS BLD VENIPUNCTURE: CPT

## 2024-02-27 PROCEDURE — G2211 COMPLEX E/M VISIT ADD ON: CPT

## 2024-02-27 PROCEDURE — 99214 OFFICE O/P EST MOD 30 MIN: CPT

## 2024-02-28 ENCOUNTER — TRANSCRIPTION ENCOUNTER (OUTPATIENT)
Age: 74
End: 2024-02-28

## 2024-02-28 LAB
ALBUMIN SERPL ELPH-MCNC: 4.8 G/DL
ALP BLD-CCNC: 90 U/L
ALT SERPL-CCNC: 27 U/L
ANION GAP SERPL CALC-SCNC: 14 MMOL/L
AST SERPL-CCNC: 23 U/L
BASOPHILS # BLD AUTO: 0.08 K/UL
BASOPHILS NFR BLD AUTO: 1.4 %
BILIRUB SERPL-MCNC: 0.3 MG/DL
BUN SERPL-MCNC: 15 MG/DL
CALCIUM SERPL-MCNC: 9.8 MG/DL
CHLORIDE SERPL-SCNC: 101 MMOL/L
CHOLEST SERPL-MCNC: 167 MG/DL
CO2 SERPL-SCNC: 22 MMOL/L
CREAT SERPL-MCNC: 0.68 MG/DL
EGFR: 99 ML/MIN/1.73M2
EOSINOPHIL # BLD AUTO: 0.17 K/UL
EOSINOPHIL NFR BLD AUTO: 3 %
ESTIMATED AVERAGE GLUCOSE: 171 MG/DL
GLUCOSE SERPL-MCNC: 173 MG/DL
HBA1C MFR BLD HPLC: 7.6 %
HCT VFR BLD CALC: 43.8 %
HDLC SERPL-MCNC: 57 MG/DL
HGB BLD-MCNC: 14.8 G/DL
IMM GRANULOCYTES NFR BLD AUTO: 0.4 %
LDLC SERPL CALC-MCNC: 87 MG/DL
LYMPHOCYTES # BLD AUTO: 1.01 K/UL
LYMPHOCYTES NFR BLD AUTO: 18 %
MAGNESIUM SERPL-MCNC: 2 MG/DL
MAN DIFF?: NORMAL
MCHC RBC-ENTMCNC: 30.8 PG
MCHC RBC-ENTMCNC: 33.8 GM/DL
MCV RBC AUTO: 91.1 FL
MONOCYTES # BLD AUTO: 0.64 K/UL
MONOCYTES NFR BLD AUTO: 11.4 %
NEUTROPHILS # BLD AUTO: 3.68 K/UL
NEUTROPHILS NFR BLD AUTO: 65.8 %
NONHDLC SERPL-MCNC: 111 MG/DL
PLATELET # BLD AUTO: 270 K/UL
POTASSIUM SERPL-SCNC: 4 MMOL/L
PROT SERPL-MCNC: 7.3 G/DL
PSA SERPL-MCNC: 2.64 NG/ML
RBC # BLD: 4.81 M/UL
RBC # FLD: 13.5 %
SODIUM SERPL-SCNC: 136 MMOL/L
TRIGL SERPL-MCNC: 134 MG/DL
TSH SERPL-ACNC: 1.38 UIU/ML
WBC # FLD AUTO: 5.6 K/UL

## 2024-03-01 NOTE — ASSESSMENT
[FreeTextEntry1] : Venipuncture done in our office, Labs sent out/further recommendations will be made based on lab results. Patient advised to continue present medications with diet/exercise and specialist follow up.Patient will return to the office in 3-4months for CP  last colonoscopy was 10/2021 with next in 5 years 24 urine was 8/2023 MA =3/2023 specialists include 1. ophthalmology-[facility]=5/2023 2.urology-Dr. Handley 3.sees orthopedic on a p.r.n. basis-Dr. Gonzales 4.Dr. Lazcano at [place] (hand)-NO FOLLOW UP NEEDED 5.podiatry-Dr. Flores 6.ENT-Dr. Rod 7. cardiology-Dr. Calloway 8.Ortho-Dr. Mukherjee 9. Chiropractor as needed shingles vaccine discussed, +got covid vaccines  Reports hepatitis C screening in the past as negative stress test 2/2024

## 2024-03-01 NOTE — HISTORY OF PRESENT ILLNESS
[FreeTextEntry1] : Patient presents for followup on hypertension/hyperlipidemia/type 2 diabetes. Patient is currently fasting for today's labs. Patient is currently on Lotrel/hydrochlorothiazide for hypertension, on Crestor for hyperlipidemia and is controlling his type 2 diabetes dietarily.  -got covid vaccines  -needs PSA per

## 2024-04-03 ENCOUNTER — RX RENEWAL (OUTPATIENT)
Age: 74
End: 2024-04-03

## 2024-04-25 ENCOUNTER — RX RENEWAL (OUTPATIENT)
Age: 74
End: 2024-04-25

## 2024-06-25 ENCOUNTER — RX RENEWAL (OUTPATIENT)
Age: 74
End: 2024-06-25

## 2024-06-25 ENCOUNTER — APPOINTMENT (OUTPATIENT)
Dept: INTERNAL MEDICINE | Facility: CLINIC | Age: 74
End: 2024-06-25
Payer: COMMERCIAL

## 2024-06-25 VITALS
DIASTOLIC BLOOD PRESSURE: 70 MMHG | RESPIRATION RATE: 16 BRPM | BODY MASS INDEX: 30.61 KG/M2 | SYSTOLIC BLOOD PRESSURE: 130 MMHG | HEART RATE: 60 BPM | HEIGHT: 67 IN | OXYGEN SATURATION: 95 % | WEIGHT: 195 LBS

## 2024-06-25 DIAGNOSIS — H93.91 UNSPECIFIED DISORDER OF RIGHT EAR: ICD-10-CM

## 2024-06-25 DIAGNOSIS — Z13.31 ENCOUNTER FOR SCREENING FOR DEPRESSION: ICD-10-CM

## 2024-06-25 DIAGNOSIS — E78.5 HYPERLIPIDEMIA, UNSPECIFIED: ICD-10-CM

## 2024-06-25 DIAGNOSIS — Z12.5 ENCOUNTER FOR SCREENING FOR MALIGNANT NEOPLASM OF PROSTATE: ICD-10-CM

## 2024-06-25 DIAGNOSIS — R97.20 ELEVATED PROSTATE, SPECIFIC ANTIGEN [PSA]: ICD-10-CM

## 2024-06-25 DIAGNOSIS — K76.0 FATTY (CHANGE OF) LIVER, NOT ELSEWHERE CLASSIFIED: ICD-10-CM

## 2024-06-25 DIAGNOSIS — I10 ESSENTIAL (PRIMARY) HYPERTENSION: ICD-10-CM

## 2024-06-25 DIAGNOSIS — E11.65 TYPE 2 DIABETES MELLITUS WITH HYPERGLYCEMIA: ICD-10-CM

## 2024-06-25 DIAGNOSIS — Z00.00 ENCOUNTER FOR GENERAL ADULT MEDICAL EXAMINATION W/OUT ABNORMAL FINDINGS: ICD-10-CM

## 2024-06-25 DIAGNOSIS — Z11.59 ENCOUNTER FOR SCREENING FOR OTHER VIRAL DISEASES: ICD-10-CM

## 2024-06-25 DIAGNOSIS — R79.89 OTHER SPECIFIED ABNORMAL FINDINGS OF BLOOD CHEMISTRY: ICD-10-CM

## 2024-06-25 DIAGNOSIS — Z79.899 OTHER LONG TERM (CURRENT) DRUG THERAPY: ICD-10-CM

## 2024-06-25 DIAGNOSIS — Z13.39 ENCOUNTER FOR SCREENING EXAM FOR OTHER MENTAL HEALTH AND BEHAVIORAL DISORDERS: ICD-10-CM

## 2024-06-25 DIAGNOSIS — E66.9 OBESITY, UNSPECIFIED: ICD-10-CM

## 2024-06-25 DIAGNOSIS — H61.21 IMPACTED CERUMEN, RIGHT EAR: ICD-10-CM

## 2024-06-25 PROCEDURE — 99397 PER PM REEVAL EST PAT 65+ YR: CPT

## 2024-06-25 PROCEDURE — 36415 COLL VENOUS BLD VENIPUNCTURE: CPT

## 2024-06-27 LAB
ALBUMIN SERPL ELPH-MCNC: 5.1 G/DL
ALP BLD-CCNC: 83 U/L
ALT SERPL-CCNC: 17 U/L
ANION GAP SERPL CALC-SCNC: 15 MMOL/L
APPEARANCE: CLEAR
AST SERPL-CCNC: 20 U/L
BACTERIA: NEGATIVE /HPF
BASOPHILS # BLD AUTO: 0.06 K/UL
BASOPHILS NFR BLD AUTO: 1.1 %
BILIRUB SERPL-MCNC: 0.5 MG/DL
BILIRUBIN URINE: NEGATIVE
BLOOD URINE: NEGATIVE
BUN SERPL-MCNC: 13 MG/DL
CALCIUM SERPL-MCNC: 10.4 MG/DL
CAST: 0 /LPF
CHLORIDE SERPL-SCNC: 101 MMOL/L
CHOLEST SERPL-MCNC: 162 MG/DL
CO2 SERPL-SCNC: 24 MMOL/L
COLOR: YELLOW
CREAT SERPL-MCNC: 0.73 MG/DL
CREAT SPEC-SCNC: 104 MG/DL
EGFR: 96 ML/MIN/1.73M2
EOSINOPHIL # BLD AUTO: 0.14 K/UL
EOSINOPHIL NFR BLD AUTO: 2.6 %
EPITHELIAL CELLS: 0 /HPF
ESTIMATED AVERAGE GLUCOSE: 166 MG/DL
GLUCOSE QUALITATIVE U: NEGATIVE MG/DL
GLUCOSE SERPL-MCNC: 126 MG/DL
HBA1C MFR BLD HPLC: 7.4 %
HCT VFR BLD CALC: 45.8 %
HDLC SERPL-MCNC: 64 MG/DL
HGB BLD-MCNC: 15.2 G/DL
IMM GRANULOCYTES NFR BLD AUTO: 0.2 %
KETONES URINE: NEGATIVE MG/DL
LDLC SERPL CALC-MCNC: 79 MG/DL
LEUKOCYTE ESTERASE URINE: NEGATIVE
LYMPHOCYTES # BLD AUTO: 1.05 K/UL
LYMPHOCYTES NFR BLD AUTO: 19.7 %
MAGNESIUM SERPL-MCNC: 2.2 MG/DL
MAN DIFF?: NORMAL
MCHC RBC-ENTMCNC: 30.7 PG
MCHC RBC-ENTMCNC: 33.2 GM/DL
MCV RBC AUTO: 92.5 FL
MICROALBUMIN 24H UR DL<=1MG/L-MCNC: 2.4 MG/DL
MICROALBUMIN/CREAT 24H UR-RTO: 23 MG/G
MICROSCOPIC-UA: NORMAL
MONOCYTES # BLD AUTO: 0.6 K/UL
MONOCYTES NFR BLD AUTO: 11.3 %
NEUTROPHILS # BLD AUTO: 3.46 K/UL
NEUTROPHILS NFR BLD AUTO: 65.1 %
NITRITE URINE: NEGATIVE
NONHDLC SERPL-MCNC: 98 MG/DL
PH URINE: 7
PLATELET # BLD AUTO: 261 K/UL
POTASSIUM SERPL-SCNC: 4 MMOL/L
PROT SERPL-MCNC: 7.8 G/DL
PROTEIN URINE: NEGATIVE MG/DL
PSA SERPL-MCNC: 2.54 NG/ML
RBC # BLD: 4.95 M/UL
RBC # FLD: 13.4 %
RED BLOOD CELLS URINE: 1 /HPF
SODIUM SERPL-SCNC: 140 MMOL/L
SPECIFIC GRAVITY URINE: 1.02
TRIGL SERPL-MCNC: 108 MG/DL
UROBILINOGEN URINE: 1 MG/DL
WBC # FLD AUTO: 5.32 K/UL
WHITE BLOOD CELLS URINE: 0 /HPF

## 2024-09-14 NOTE — ASU PREOP CHECKLIST - WEIGHT IN LBS
Problem: Safety - Adult  Goal: Free from fall injury  Outcome: Progressing     Problem: Safety - Adult  Goal: Free from fall injury  Outcome: Progressing     Problem: Discharge Planning  Goal: Discharge to home or other facility with appropriate resources  Outcome: Progressing     Problem: Skin/Tissue Integrity  Goal: Absence of new skin breakdown  Outcome: Progressing     Problem: ABCDS Injury Assessment  Goal: Absence of physical injury  Outcome: Progressing      199.9

## 2024-09-23 ENCOUNTER — APPOINTMENT (OUTPATIENT)
Dept: INTERNAL MEDICINE | Facility: CLINIC | Age: 74
End: 2024-09-23
Payer: COMMERCIAL

## 2024-09-23 VITALS
RESPIRATION RATE: 13 BRPM | SYSTOLIC BLOOD PRESSURE: 125 MMHG | HEART RATE: 63 BPM | BODY MASS INDEX: 30.29 KG/M2 | OXYGEN SATURATION: 97 % | DIASTOLIC BLOOD PRESSURE: 80 MMHG | WEIGHT: 193 LBS | HEIGHT: 67 IN

## 2024-09-23 DIAGNOSIS — M54.2 CERVICALGIA: ICD-10-CM

## 2024-09-23 PROCEDURE — 99213 OFFICE O/P EST LOW 20 MIN: CPT

## 2024-09-23 PROCEDURE — G2211 COMPLEX E/M VISIT ADD ON: CPT | Mod: NC

## 2024-09-23 RX ORDER — METAXALONE 800 MG/1
800 TABLET ORAL 3 TIMES DAILY
Qty: 30 | Refills: 0 | Status: ACTIVE | COMMUNITY
Start: 2024-09-23 | End: 1900-01-01

## 2024-09-23 RX ORDER — NAPROXEN 500 MG/1
500 TABLET, DELAYED RELEASE ORAL
Qty: 30 | Refills: 0 | Status: ACTIVE | COMMUNITY
Start: 2024-09-23 | End: 1900-01-01

## 2024-09-23 NOTE — HISTORY OF PRESENT ILLNESS
[FreeTextEntry8] : Patient presents complaining of left neck/upper back pain for about 6 days.  Patient went to the chiropractor x 2 and took Advil which did help.  Patient states it feels as though it is in spasm.  Patient states he has been walking 4 miles daily and walks with his head down therefore not sure if that caused the issue, no fall or trauma.  Patient has no chest pain/dyspnea/arm pain or jaw pain

## 2024-09-23 NOTE — ASSESSMENT
[FreeTextEntry1] : Skelaxin/naproxen given, patient to apply heat and continue chiropractic care.  Patient will call if symptoms continue or worsen and return to the office as scheduled for regular follow-up   Dr. De Jesus was present in office building while I examined patient

## 2024-09-23 NOTE — PHYSICAL EXAM
[No Acute Distress] : no acute distress [No Respiratory Distress] : no respiratory distress  [Clear to Auscultation] : lungs were clear to auscultation bilaterally [Normal Rate] : normal rate  [Regular Rhythm] : with a regular rhythm [Normal Affect] : the affect was normal [Normal Mood] : the mood was normal [de-identified] : No bony deformity, + reproducible left trapezius/left thoracic paravertebral muscle discomfort with spasm noted

## 2024-10-02 ENCOUNTER — RX RENEWAL (OUTPATIENT)
Age: 74
End: 2024-10-02

## 2024-10-08 NOTE — ASSESSMENT
[FreeTextEntry1] : 71-year-old male with history of hypertension which is controlled with present medications, hyperlipidemia which is also controlled and type 2 diabetes very much better control the patient having a good lifestyle changes therefore encouraged continued diet and exercise\par  \par Currently medically stable without any active medical issues.\par \par He is to followup with urology as scheduled. PSA will be done today and sent to urology.\par Allergy followup scheduled next month\par He is to continue present medications\par \par Colonoscopy August 2018 with followup in 3 years secondary to history of polyps\par Ophthalmology yearly. october 2020 with no diabetic retinopathy.\par \par All of the vaccines up-to-date\par Already received influenza COVID x 3 and vaccines\par \par Venipuncture done today in the office\par \par Followup in 3 months
Prophylactic measure
Phlebitis of left arm

## 2024-12-20 ENCOUNTER — APPOINTMENT (OUTPATIENT)
Dept: INTERNAL MEDICINE | Facility: CLINIC | Age: 74
End: 2024-12-20
Payer: COMMERCIAL

## 2024-12-20 VITALS
DIASTOLIC BLOOD PRESSURE: 80 MMHG | SYSTOLIC BLOOD PRESSURE: 120 MMHG | HEART RATE: 76 BPM | BODY MASS INDEX: 30.7 KG/M2 | RESPIRATION RATE: 14 BRPM | WEIGHT: 196 LBS

## 2024-12-20 DIAGNOSIS — K76.0 FATTY (CHANGE OF) LIVER, NOT ELSEWHERE CLASSIFIED: ICD-10-CM

## 2024-12-20 DIAGNOSIS — N40.0 BENIGN PROSTATIC HYPERPLASIA WITHOUT LOWER URINARY TRACT SYMPMS: ICD-10-CM

## 2024-12-20 DIAGNOSIS — Z79.899 OTHER LONG TERM (CURRENT) DRUG THERAPY: ICD-10-CM

## 2024-12-20 DIAGNOSIS — E66.9 OBESITY, UNSPECIFIED: ICD-10-CM

## 2024-12-20 PROCEDURE — 36415 COLL VENOUS BLD VENIPUNCTURE: CPT

## 2024-12-20 PROCEDURE — G2211 COMPLEX E/M VISIT ADD ON: CPT | Mod: NC

## 2024-12-20 PROCEDURE — 99214 OFFICE O/P EST MOD 30 MIN: CPT

## 2024-12-21 LAB
ALBUMIN SERPL ELPH-MCNC: 4.6 G/DL
ALP BLD-CCNC: 107 U/L
ALT SERPL-CCNC: 24 U/L
ANION GAP SERPL CALC-SCNC: 13 MMOL/L
AST SERPL-CCNC: 17 U/L
BASOPHILS # BLD AUTO: 0.09 K/UL
BASOPHILS NFR BLD AUTO: 1.4 %
BILIRUB SERPL-MCNC: 0.3 MG/DL
BUN SERPL-MCNC: 14 MG/DL
CALCIUM SERPL-MCNC: 10.1 MG/DL
CHLORIDE SERPL-SCNC: 102 MMOL/L
CHOLEST SERPL-MCNC: 157 MG/DL
CO2 SERPL-SCNC: 27 MMOL/L
CREAT SERPL-MCNC: 0.78 MG/DL
EGFR: 94 ML/MIN/1.73M2
EOSINOPHIL # BLD AUTO: 0.15 K/UL
EOSINOPHIL NFR BLD AUTO: 2.3 %
ESTIMATED AVERAGE GLUCOSE: 203 MG/DL
GLUCOSE SERPL-MCNC: 220 MG/DL
HBA1C MFR BLD HPLC: 8.7 %
HCT VFR BLD CALC: 44.7 %
HDLC SERPL-MCNC: 59 MG/DL
HGB BLD-MCNC: 14.8 G/DL
IMM GRANULOCYTES NFR BLD AUTO: 0.3 %
LDLC SERPL CALC-MCNC: 82 MG/DL
LYMPHOCYTES # BLD AUTO: 1.02 K/UL
LYMPHOCYTES NFR BLD AUTO: 15.3 %
MAGNESIUM SERPL-MCNC: 2.1 MG/DL
MAN DIFF?: NORMAL
MCHC RBC-ENTMCNC: 30.5 PG
MCHC RBC-ENTMCNC: 33.1 G/DL
MCV RBC AUTO: 92.2 FL
MONOCYTES # BLD AUTO: 0.68 K/UL
MONOCYTES NFR BLD AUTO: 10.2 %
NEUTROPHILS # BLD AUTO: 4.7 K/UL
NEUTROPHILS NFR BLD AUTO: 70.5 %
NONHDLC SERPL-MCNC: 98 MG/DL
PLATELET # BLD AUTO: 277 K/UL
POTASSIUM SERPL-SCNC: 4.2 MMOL/L
PROT SERPL-MCNC: 7.2 G/DL
PSA SERPL-MCNC: 2.45 NG/ML
RBC # BLD: 4.85 M/UL
RBC # FLD: 12.9 %
SODIUM SERPL-SCNC: 142 MMOL/L
TRIGL SERPL-MCNC: 88 MG/DL
TSH SERPL-ACNC: 1.28 UIU/ML
WBC # FLD AUTO: 6.66 K/UL

## 2024-12-27 ENCOUNTER — NON-APPOINTMENT (OUTPATIENT)
Age: 74
End: 2024-12-27

## 2024-12-27 ENCOUNTER — APPOINTMENT (OUTPATIENT)
Dept: CARDIOLOGY | Facility: CLINIC | Age: 74
End: 2024-12-27

## 2024-12-27 VITALS
DIASTOLIC BLOOD PRESSURE: 70 MMHG | WEIGHT: 198 LBS | HEART RATE: 64 BPM | SYSTOLIC BLOOD PRESSURE: 130 MMHG | BODY MASS INDEX: 31.08 KG/M2 | HEIGHT: 67 IN | OXYGEN SATURATION: 96 %

## 2024-12-27 DIAGNOSIS — E78.5 HYPERLIPIDEMIA, UNSPECIFIED: ICD-10-CM

## 2024-12-27 DIAGNOSIS — M17.11 UNILATERAL PRIMARY OSTEOARTHRITIS, RIGHT KNEE: ICD-10-CM

## 2024-12-27 DIAGNOSIS — I10 ESSENTIAL (PRIMARY) HYPERTENSION: ICD-10-CM

## 2024-12-27 DIAGNOSIS — E11.9 TYPE 2 DIABETES MELLITUS W/OUT COMPLICATIONS: ICD-10-CM

## 2024-12-27 PROCEDURE — 99213 OFFICE O/P EST LOW 20 MIN: CPT | Mod: 25

## 2024-12-27 PROCEDURE — 93000 ELECTROCARDIOGRAM COMPLETE: CPT

## 2024-12-31 RX ORDER — SEMAGLUTIDE 0.68 MG/ML
2 INJECTION, SOLUTION SUBCUTANEOUS
Qty: 3 | Refills: 1 | Status: ACTIVE | COMMUNITY
Start: 2024-12-23

## 2025-01-09 LAB
CREAT 24H UR-MCNC: 1.7 G/24 H
CREAT ?TM UR-MCNC: 86 MG/DL
PROT 24H UR-MRATE: 17 MG/DL
PROT ?TM UR-MCNC: 24 HR
PROT UR-MCNC: 332 MG/24 H
SPECIMEN VOL 24H UR: 1950 ML

## 2025-01-27 ENCOUNTER — RX RENEWAL (OUTPATIENT)
Age: 75
End: 2025-01-27

## 2025-03-21 ENCOUNTER — APPOINTMENT (OUTPATIENT)
Dept: INTERNAL MEDICINE | Facility: CLINIC | Age: 75
End: 2025-03-21
Payer: COMMERCIAL

## 2025-03-21 VITALS
HEART RATE: 68 BPM | WEIGHT: 196 LBS | BODY MASS INDEX: 30.76 KG/M2 | RESPIRATION RATE: 15 BRPM | OXYGEN SATURATION: 97 % | DIASTOLIC BLOOD PRESSURE: 78 MMHG | HEIGHT: 67 IN | SYSTOLIC BLOOD PRESSURE: 120 MMHG

## 2025-03-21 DIAGNOSIS — E78.5 HYPERLIPIDEMIA, UNSPECIFIED: ICD-10-CM

## 2025-03-21 DIAGNOSIS — E66.9 OBESITY, UNSPECIFIED: ICD-10-CM

## 2025-03-21 DIAGNOSIS — R97.20 ELEVATED PROSTATE, SPECIFIC ANTIGEN [PSA]: ICD-10-CM

## 2025-03-21 DIAGNOSIS — Z79.899 OTHER LONG TERM (CURRENT) DRUG THERAPY: ICD-10-CM

## 2025-03-21 DIAGNOSIS — K76.0 FATTY (CHANGE OF) LIVER, NOT ELSEWHERE CLASSIFIED: ICD-10-CM

## 2025-03-21 DIAGNOSIS — I10 ESSENTIAL (PRIMARY) HYPERTENSION: ICD-10-CM

## 2025-03-21 DIAGNOSIS — E11.9 TYPE 2 DIABETES MELLITUS W/OUT COMPLICATIONS: ICD-10-CM

## 2025-03-21 PROCEDURE — 36415 COLL VENOUS BLD VENIPUNCTURE: CPT

## 2025-03-21 PROCEDURE — 99214 OFFICE O/P EST MOD 30 MIN: CPT

## 2025-03-21 PROCEDURE — G2211 COMPLEX E/M VISIT ADD ON: CPT | Mod: NC

## 2025-03-21 RX ORDER — TADALAFIL 5 MG/1
5 TABLET, FILM COATED ORAL
Qty: 30 | Refills: 11 | Status: ACTIVE | COMMUNITY
Start: 2025-03-21

## 2025-03-23 LAB
ALBUMIN SERPL ELPH-MCNC: 4.6 G/DL
ALP BLD-CCNC: 85 U/L
ALT SERPL-CCNC: 18 U/L
ANION GAP SERPL CALC-SCNC: 13 MMOL/L
AST SERPL-CCNC: 17 U/L
BASOPHILS # BLD AUTO: 0.1 K/UL
BASOPHILS NFR BLD AUTO: 1.6 %
BILIRUB SERPL-MCNC: 0.4 MG/DL
BUN SERPL-MCNC: 12 MG/DL
CALCIUM SERPL-MCNC: 10.4 MG/DL
CHLORIDE SERPL-SCNC: 104 MMOL/L
CHOLEST SERPL-MCNC: 156 MG/DL
CO2 SERPL-SCNC: 23 MMOL/L
CREAT SERPL-MCNC: 0.71 MG/DL
EGFRCR SERPLBLD CKD-EPI 2021: 97 ML/MIN/1.73M2
EOSINOPHIL # BLD AUTO: 0.22 K/UL
EOSINOPHIL NFR BLD AUTO: 3.5 %
ESTIMATED AVERAGE GLUCOSE: 151 MG/DL
GLUCOSE SERPL-MCNC: 133 MG/DL
HBA1C MFR BLD HPLC: 6.9 %
HCT VFR BLD CALC: 43 %
HDLC SERPL-MCNC: 60 MG/DL
HGB BLD-MCNC: 14.6 G/DL
IMM GRANULOCYTES NFR BLD AUTO: 0.3 %
LDLC SERPL-MCNC: 70 MG/DL
LYMPHOCYTES # BLD AUTO: 0.94 K/UL
LYMPHOCYTES NFR BLD AUTO: 14.8 %
MAGNESIUM SERPL-MCNC: 2 MG/DL
MAN DIFF?: NORMAL
MCHC RBC-ENTMCNC: 29.9 PG
MCHC RBC-ENTMCNC: 34 G/DL
MCV RBC AUTO: 88.1 FL
MONOCYTES # BLD AUTO: 0.64 K/UL
MONOCYTES NFR BLD AUTO: 10.1 %
NEUTROPHILS # BLD AUTO: 4.43 K/UL
NEUTROPHILS NFR BLD AUTO: 69.7 %
NONHDLC SERPL-MCNC: 96 MG/DL
PLATELET # BLD AUTO: 246 K/UL
POTASSIUM SERPL-SCNC: 3.9 MMOL/L
PROT SERPL-MCNC: 6.9 G/DL
PSA SERPL-MCNC: 2.28 NG/ML
RBC # BLD: 4.88 M/UL
RBC # FLD: 13.6 %
SODIUM SERPL-SCNC: 140 MMOL/L
TRIGL SERPL-MCNC: 152 MG/DL
TSH SERPL-ACNC: 1.21 UIU/ML
WBC # FLD AUTO: 6.35 K/UL

## 2025-03-24 LAB — PSA SERPL-MCNC: NORMAL

## 2025-04-15 ENCOUNTER — RX RENEWAL (OUTPATIENT)
Age: 75
End: 2025-04-15

## 2025-05-23 ENCOUNTER — NON-APPOINTMENT (OUTPATIENT)
Age: 75
End: 2025-05-23

## 2025-07-03 ENCOUNTER — NON-APPOINTMENT (OUTPATIENT)
Age: 75
End: 2025-07-03

## 2025-07-07 ENCOUNTER — APPOINTMENT (OUTPATIENT)
Dept: INTERNAL MEDICINE | Facility: CLINIC | Age: 75
End: 2025-07-07
Payer: COMMERCIAL

## 2025-07-07 VITALS
DIASTOLIC BLOOD PRESSURE: 78 MMHG | HEIGHT: 67 IN | RESPIRATION RATE: 14 BRPM | SYSTOLIC BLOOD PRESSURE: 120 MMHG | WEIGHT: 198.25 LBS | OXYGEN SATURATION: 95 % | HEART RATE: 69 BPM | BODY MASS INDEX: 31.11 KG/M2

## 2025-07-07 PROBLEM — M51.369 DDD (DEGENERATIVE DISC DISEASE), LUMBAR: Status: ACTIVE | Noted: 2022-12-23

## 2025-07-07 PROBLEM — Z23 NEED FOR TDAP VACCINATION: Status: ACTIVE | Noted: 2025-07-07

## 2025-07-07 PROCEDURE — 36415 COLL VENOUS BLD VENIPUNCTURE: CPT

## 2025-07-07 PROCEDURE — 99397 PER PM REEVAL EST PAT 65+ YR: CPT | Mod: 25

## 2025-07-07 PROCEDURE — 90471 IMMUNIZATION ADMIN: CPT

## 2025-07-07 PROCEDURE — 90715 TDAP VACCINE 7 YRS/> IM: CPT

## 2025-07-07 RX ORDER — SEMAGLUTIDE 1.34 MG/ML
4 INJECTION, SOLUTION SUBCUTANEOUS
Qty: 3 | Refills: 1 | Status: ACTIVE | COMMUNITY
Start: 2025-07-07 | End: 1900-01-01

## 2025-07-08 LAB
ALBUMIN SERPL ELPH-MCNC: 4.6 G/DL
ALP BLD-CCNC: 108 U/L
ALT SERPL-CCNC: 28 U/L
ANION GAP SERPL CALC-SCNC: 14 MMOL/L
APPEARANCE: CLEAR
AST SERPL-CCNC: 25 U/L
BACTERIA: NEGATIVE /HPF
BASOPHILS # BLD AUTO: 0.09 K/UL
BASOPHILS NFR BLD AUTO: 1.3 %
BILIRUB SERPL-MCNC: 0.3 MG/DL
BILIRUBIN URINE: NEGATIVE
BLOOD URINE: NEGATIVE
BUN SERPL-MCNC: 14 MG/DL
CALCIUM SERPL-MCNC: 9.8 MG/DL
CAST: 0 /LPF
CHLORIDE SERPL-SCNC: 101 MMOL/L
CHOLEST SERPL-MCNC: 164 MG/DL
CO2 SERPL-SCNC: 23 MMOL/L
COLOR: YELLOW
CREAT SERPL-MCNC: 0.65 MG/DL
CREAT SPEC-SCNC: 83 MG/DL
EGFRCR SERPLBLD CKD-EPI 2021: 99 ML/MIN/1.73M2
EOSINOPHIL # BLD AUTO: 0.25 K/UL
EOSINOPHIL NFR BLD AUTO: 3.5 %
EPITHELIAL CELLS: 0 /HPF
ESTIMATED AVERAGE GLUCOSE: 151 MG/DL
GLUCOSE QUALITATIVE U: NEGATIVE MG/DL
GLUCOSE SERPL-MCNC: 152 MG/DL
HBA1C MFR BLD HPLC: 6.9 %
HCT VFR BLD CALC: 44.6 %
HDLC SERPL-MCNC: 60 MG/DL
HGB BLD-MCNC: 14.5 G/DL
IMM GRANULOCYTES NFR BLD AUTO: 0.3 %
KETONES URINE: NEGATIVE MG/DL
LDLC SERPL-MCNC: 84 MG/DL
LEUKOCYTE ESTERASE URINE: NEGATIVE
LYMPHOCYTES # BLD AUTO: 1.03 K/UL
LYMPHOCYTES NFR BLD AUTO: 14.5 %
MAGNESIUM SERPL-MCNC: 2 MG/DL
MAN DIFF?: NORMAL
MCHC RBC-ENTMCNC: 30.3 PG
MCHC RBC-ENTMCNC: 32.5 G/DL
MCV RBC AUTO: 93.1 FL
MICROALBUMIN 24H UR DL<=1MG/L-MCNC: 1.3 MG/DL
MICROALBUMIN/CREAT 24H UR-RTO: 16 MG/G
MICROSCOPIC-UA: NORMAL
MONOCYTES # BLD AUTO: 0.69 K/UL
MONOCYTES NFR BLD AUTO: 9.7 %
NEUTROPHILS # BLD AUTO: 5 K/UL
NEUTROPHILS NFR BLD AUTO: 70.7 %
NITRITE URINE: NEGATIVE
NONHDLC SERPL-MCNC: 103 MG/DL
PH URINE: 6
PLATELET # BLD AUTO: 251 K/UL
POTASSIUM SERPL-SCNC: 3.8 MMOL/L
PROT SERPL-MCNC: 7.1 G/DL
PROTEIN URINE: NEGATIVE MG/DL
RBC # BLD: 4.79 M/UL
RBC # FLD: 13.9 %
RED BLOOD CELLS URINE: 0 /HPF
SODIUM SERPL-SCNC: 138 MMOL/L
SPECIFIC GRAVITY URINE: 1.02
TRIGL SERPL-MCNC: 108 MG/DL
UROBILINOGEN URINE: 0.2 MG/DL
WBC # FLD AUTO: 7.08 K/UL
WHITE BLOOD CELLS URINE: 0 /HPF

## 2025-07-10 ENCOUNTER — RX RENEWAL (OUTPATIENT)
Age: 75
End: 2025-07-10

## 2025-07-14 ENCOUNTER — RX RENEWAL (OUTPATIENT)
Age: 75
End: 2025-07-14

## 2025-07-25 ENCOUNTER — APPOINTMENT (OUTPATIENT)
Dept: INTERNAL MEDICINE | Facility: CLINIC | Age: 75
End: 2025-07-25
Payer: COMMERCIAL

## 2025-07-25 VITALS
HEIGHT: 67 IN | SYSTOLIC BLOOD PRESSURE: 120 MMHG | HEART RATE: 97 BPM | DIASTOLIC BLOOD PRESSURE: 80 MMHG | BODY MASS INDEX: 30.76 KG/M2 | RESPIRATION RATE: 14 BRPM | WEIGHT: 196 LBS | OXYGEN SATURATION: 97 %

## 2025-07-25 DIAGNOSIS — D17.9 BENIGN LIPOMATOUS NEOPLASM, UNSPECIFIED: ICD-10-CM

## 2025-07-25 PROCEDURE — G2211 COMPLEX E/M VISIT ADD ON: CPT | Mod: NC

## 2025-07-25 PROCEDURE — 99213 OFFICE O/P EST LOW 20 MIN: CPT

## 2025-08-05 ENCOUNTER — NON-APPOINTMENT (OUTPATIENT)
Age: 75
End: 2025-08-05

## 2025-08-25 ENCOUNTER — RX RENEWAL (OUTPATIENT)
Age: 75
End: 2025-08-25